# Patient Record
Sex: MALE | Race: WHITE | Employment: OTHER | URBAN - METROPOLITAN AREA
[De-identification: names, ages, dates, MRNs, and addresses within clinical notes are randomized per-mention and may not be internally consistent; named-entity substitution may affect disease eponyms.]

---

## 2017-04-07 ENCOUNTER — HOSPITAL ENCOUNTER (OUTPATIENT)
Age: 77
Setting detail: SPECIMEN
Discharge: HOME | End: 2017-04-07
Payer: MEDICARE

## 2017-04-07 DIAGNOSIS — E78.00 HYPERCHOLESTEREMIA: ICD-10-CM

## 2017-04-07 DIAGNOSIS — I10 ESSENTIAL HYPERTENSION, BENIGN: ICD-10-CM

## 2017-04-07 LAB
ALBUMIN SERPL-MCNC: 4.2 G/DL (ref 3.5–5.2)
ALBUMIN/GLOBULIN RATIO: 1.2 (ref 1–2.5)
ALP BLD-CCNC: 95 U/L (ref 40–129)
ALT SERPL-CCNC: 13 U/L (ref 5–41)
ANION GAP SERPL CALCULATED.3IONS-SCNC: 11 MMOL/L (ref 9–17)
AST SERPL-CCNC: 18 U/L
BILIRUB SERPL-MCNC: 0.4 MG/DL (ref 0.3–1.2)
BUN BLDV-MCNC: 12 MG/DL (ref 8–23)
BUN/CREAT BLD: ABNORMAL (ref 9–20)
CALCIUM SERPL-MCNC: 8.8 MG/DL (ref 8.6–10.4)
CHLORIDE BLD-SCNC: 97 MMOL/L (ref 98–107)
CHOLESTEROL/HDL RATIO: 5.2
CHOLESTEROL: 203 MG/DL
CO2: 26 MMOL/L (ref 20–31)
CREAT SERPL-MCNC: 0.9 MG/DL (ref 0.7–1.2)
GFR AFRICAN AMERICAN: >60 ML/MIN
GFR NON-AFRICAN AMERICAN: >60 ML/MIN
GFR SERPL CREATININE-BSD FRML MDRD: ABNORMAL ML/MIN/{1.73_M2}
GFR SERPL CREATININE-BSD FRML MDRD: ABNORMAL ML/MIN/{1.73_M2}
GLUCOSE BLD-MCNC: 84 MG/DL (ref 70–99)
HCT VFR BLD CALC: 45 % (ref 41–53)
HDLC SERPL-MCNC: 39 MG/DL
HEMOGLOBIN: 14.8 G/DL (ref 13.5–17.5)
LDL CHOLESTEROL: 125 MG/DL (ref 0–130)
MCH RBC QN AUTO: 28.4 PG (ref 26–34)
MCHC RBC AUTO-ENTMCNC: 32.8 G/DL (ref 31–37)
MCV RBC AUTO: 86.7 FL (ref 80–100)
PDW BLD-RTO: 14.4 % (ref 12.5–15.4)
PLATELET # BLD: 346 K/UL (ref 140–450)
PMV BLD AUTO: 8.2 FL (ref 6–12)
POTASSIUM SERPL-SCNC: 4.6 MMOL/L (ref 3.7–5.3)
RBC # BLD: 5.19 M/UL (ref 4.5–5.9)
SODIUM BLD-SCNC: 134 MMOL/L (ref 135–144)
TOTAL PROTEIN: 7.7 G/DL (ref 6.4–8.3)
TRIGL SERPL-MCNC: 193 MG/DL
VLDLC SERPL CALC-MCNC: ABNORMAL MG/DL (ref 1–30)
WBC # BLD: 6.4 K/UL (ref 3.5–11)

## 2018-01-09 PROBLEM — K90.0 CELIAC DISEASE: Status: ACTIVE | Noted: 2018-01-09

## 2018-01-23 ENCOUNTER — HOSPITAL ENCOUNTER (OUTPATIENT)
Age: 78
Setting detail: SPECIMEN
Discharge: HOME | End: 2018-01-23
Payer: MEDICARE

## 2018-01-23 DIAGNOSIS — I10 ESSENTIAL HYPERTENSION, BENIGN: ICD-10-CM

## 2018-01-23 DIAGNOSIS — E78.00 HYPERCHOLESTEREMIA: ICD-10-CM

## 2018-01-23 LAB
ALBUMIN SERPL-MCNC: 4.1 G/DL (ref 3.5–5.2)
ALBUMIN/GLOBULIN RATIO: 1.1 (ref 1–2.5)
ALP BLD-CCNC: 126 U/L (ref 40–129)
ALT SERPL-CCNC: 16 U/L (ref 5–41)
ANION GAP SERPL CALCULATED.3IONS-SCNC: 15 MMOL/L (ref 9–17)
AST SERPL-CCNC: 19 U/L
BILIRUB SERPL-MCNC: 0.51 MG/DL (ref 0.3–1.2)
BUN BLDV-MCNC: 16 MG/DL (ref 8–23)
BUN/CREAT BLD: ABNORMAL (ref 9–20)
CALCIUM SERPL-MCNC: 9.2 MG/DL (ref 8.6–10.4)
CHLORIDE BLD-SCNC: 99 MMOL/L (ref 98–107)
CHOLESTEROL/HDL RATIO: 3.3
CHOLESTEROL: 144 MG/DL
CO2: 24 MMOL/L (ref 20–31)
CREAT SERPL-MCNC: 0.68 MG/DL (ref 0.7–1.2)
GFR AFRICAN AMERICAN: >60 ML/MIN
GFR NON-AFRICAN AMERICAN: >60 ML/MIN
GFR SERPL CREATININE-BSD FRML MDRD: ABNORMAL ML/MIN/{1.73_M2}
GFR SERPL CREATININE-BSD FRML MDRD: ABNORMAL ML/MIN/{1.73_M2}
GLUCOSE FASTING: 89 MG/DL (ref 70–99)
HCT VFR BLD CALC: 44.7 % (ref 40.7–50.3)
HDLC SERPL-MCNC: 44 MG/DL
HEMOGLOBIN: 13.9 G/DL (ref 13–17)
LDL CHOLESTEROL: 76 MG/DL (ref 0–130)
MCH RBC QN AUTO: 27.9 PG (ref 25.2–33.5)
MCHC RBC AUTO-ENTMCNC: 31.1 G/DL (ref 28.4–34.8)
MCV RBC AUTO: 89.6 FL (ref 82.6–102.9)
NRBC AUTOMATED: 0 PER 100 WBC
PDW BLD-RTO: 12.3 % (ref 11.8–14.4)
PLATELET # BLD: 69 K/UL (ref 138–453)
PMV BLD AUTO: 12 FL (ref 8.1–13.5)
POTASSIUM SERPL-SCNC: 4.2 MMOL/L (ref 3.7–5.3)
RBC # BLD: 4.99 M/UL (ref 4.21–5.77)
SODIUM BLD-SCNC: 138 MMOL/L (ref 135–144)
TOTAL PROTEIN: 7.8 G/DL (ref 6.4–8.3)
TRIGL SERPL-MCNC: 121 MG/DL
TSH SERPL DL<=0.05 MIU/L-ACNC: <0.01 MIU/L (ref 0.3–5)
VLDLC SERPL CALC-MCNC: NORMAL MG/DL (ref 1–30)
WBC # BLD: 8.4 K/UL (ref 3.5–11.3)

## 2018-01-26 PROBLEM — E05.90 HYPERTHYROIDISM: Status: ACTIVE | Noted: 2018-01-26

## 2018-06-05 ENCOUNTER — HOSPITAL ENCOUNTER (OUTPATIENT)
Dept: GENERAL RADIOLOGY | Age: 78
Discharge: HOME | End: 2018-06-07
Payer: MEDICARE

## 2018-06-05 ENCOUNTER — OFFICE VISIT (OUTPATIENT)
Dept: ORTHOPEDIC SURGERY | Age: 78
End: 2018-06-05
Payer: MEDICARE

## 2018-06-05 ENCOUNTER — HOSPITAL ENCOUNTER (OUTPATIENT)
Age: 78
Discharge: HOME | End: 2018-06-07
Payer: MEDICARE

## 2018-06-05 VITALS — BODY MASS INDEX: 29.87 KG/M2 | HEIGHT: 68 IN | WEIGHT: 197.09 LBS

## 2018-06-05 DIAGNOSIS — M25.561 CHRONIC PAIN OF BOTH KNEES: ICD-10-CM

## 2018-06-05 DIAGNOSIS — M17.12 PRIMARY OSTEOARTHRITIS OF LEFT KNEE: ICD-10-CM

## 2018-06-05 DIAGNOSIS — M17.11 PRIMARY OSTEOARTHRITIS OF RIGHT KNEE: Primary | ICD-10-CM

## 2018-06-05 DIAGNOSIS — G89.29 CHRONIC PAIN OF BOTH KNEES: ICD-10-CM

## 2018-06-05 DIAGNOSIS — M17.11 PRIMARY OSTEOARTHRITIS OF RIGHT KNEE: ICD-10-CM

## 2018-06-05 DIAGNOSIS — M25.562 CHRONIC PAIN OF BOTH KNEES: ICD-10-CM

## 2018-06-05 PROCEDURE — G8598 ASA/ANTIPLAT THER USED: HCPCS | Performed by: ORTHOPAEDIC SURGERY

## 2018-06-05 PROCEDURE — 4040F PNEUMOC VAC/ADMIN/RCVD: CPT | Performed by: ORTHOPAEDIC SURGERY

## 2018-06-05 PROCEDURE — 73565 X-RAY EXAM OF KNEES: CPT

## 2018-06-05 PROCEDURE — 20610 DRAIN/INJ JOINT/BURSA W/O US: CPT | Performed by: ORTHOPAEDIC SURGERY

## 2018-06-05 PROCEDURE — G8427 DOCREV CUR MEDS BY ELIG CLIN: HCPCS | Performed by: ORTHOPAEDIC SURGERY

## 2018-06-05 PROCEDURE — G8417 CALC BMI ABV UP PARAM F/U: HCPCS | Performed by: ORTHOPAEDIC SURGERY

## 2018-06-05 PROCEDURE — 73560 X-RAY EXAM OF KNEE 1 OR 2: CPT

## 2018-06-05 PROCEDURE — 1123F ACP DISCUSS/DSCN MKR DOCD: CPT | Performed by: ORTHOPAEDIC SURGERY

## 2018-06-05 PROCEDURE — 1036F TOBACCO NON-USER: CPT | Performed by: ORTHOPAEDIC SURGERY

## 2018-06-05 RX ORDER — AMIODARONE HYDROCHLORIDE 200 MG/1
200 TABLET ORAL DAILY
COMMUNITY
Start: 2018-03-19 | End: 2020-08-24 | Stop reason: ALTCHOICE

## 2018-06-05 RX ORDER — METHYLPREDNISOLONE ACETATE 40 MG/ML
40 INJECTION, SUSPENSION INTRA-ARTICULAR; INTRALESIONAL; INTRAMUSCULAR; SOFT TISSUE ONCE
Status: COMPLETED | OUTPATIENT
Start: 2018-06-05 | End: 2018-06-05

## 2018-06-05 RX ADMIN — METHYLPREDNISOLONE ACETATE 40 MG: 40 INJECTION, SUSPENSION INTRA-ARTICULAR; INTRALESIONAL; INTRAMUSCULAR; SOFT TISSUE at 12:07

## 2018-06-05 RX ADMIN — METHYLPREDNISOLONE ACETATE 40 MG: 40 INJECTION, SUSPENSION INTRA-ARTICULAR; INTRALESIONAL; INTRAMUSCULAR; SOFT TISSUE at 12:06

## 2018-10-22 ENCOUNTER — HOSPITAL ENCOUNTER (OUTPATIENT)
Age: 78
Setting detail: SPECIMEN
Discharge: HOME | End: 2018-10-22
Payer: MEDICARE

## 2018-10-22 DIAGNOSIS — E55.9 VITAMIN D INSUFFICIENCY: ICD-10-CM

## 2018-10-22 DIAGNOSIS — F32.A DEPRESSION, UNSPECIFIED DEPRESSION TYPE: ICD-10-CM

## 2018-10-22 DIAGNOSIS — R79.89 LOW TESTOSTERONE IN MALE: ICD-10-CM

## 2018-10-22 DIAGNOSIS — R53.83 FATIGUE, UNSPECIFIED TYPE: ICD-10-CM

## 2018-10-22 LAB
ALBUMIN SERPL-MCNC: 3.9 G/DL (ref 3.5–5.2)
ALBUMIN/GLOBULIN RATIO: 1 (ref 1–2.5)
ALP BLD-CCNC: 103 U/L (ref 40–129)
ALT SERPL-CCNC: 16 U/L (ref 5–41)
ANION GAP SERPL CALCULATED.3IONS-SCNC: 14 MMOL/L (ref 9–17)
AST SERPL-CCNC: 18 U/L
BILIRUB SERPL-MCNC: 0.35 MG/DL (ref 0.3–1.2)
BUN BLDV-MCNC: 23 MG/DL (ref 8–23)
BUN/CREAT BLD: NORMAL (ref 9–20)
CALCIUM SERPL-MCNC: 9.4 MG/DL (ref 8.6–10.4)
CHLORIDE BLD-SCNC: 102 MMOL/L (ref 98–107)
CO2: 27 MMOL/L (ref 20–31)
CREAT SERPL-MCNC: 1.14 MG/DL (ref 0.7–1.2)
GFR AFRICAN AMERICAN: >60 ML/MIN
GFR NON-AFRICAN AMERICAN: >60 ML/MIN
GFR SERPL CREATININE-BSD FRML MDRD: NORMAL ML/MIN/{1.73_M2}
GFR SERPL CREATININE-BSD FRML MDRD: NORMAL ML/MIN/{1.73_M2}
GLUCOSE BLD-MCNC: 80 MG/DL (ref 70–99)
HCT VFR BLD CALC: 43 % (ref 40.7–50.3)
HEMOGLOBIN: 13.2 G/DL (ref 13–17)
MCH RBC QN AUTO: 28.7 PG (ref 25.2–33.5)
MCHC RBC AUTO-ENTMCNC: 30.7 G/DL (ref 28.4–34.8)
MCV RBC AUTO: 93.5 FL (ref 82.6–102.9)
NRBC AUTOMATED: 0 PER 100 WBC
PDW BLD-RTO: 12.9 % (ref 11.8–14.4)
PLATELET # BLD: 389 K/UL (ref 138–453)
PMV BLD AUTO: 10.3 FL (ref 8.1–13.5)
POTASSIUM SERPL-SCNC: 4.5 MMOL/L (ref 3.7–5.3)
RBC # BLD: 4.6 M/UL (ref 4.21–5.77)
SODIUM BLD-SCNC: 143 MMOL/L (ref 135–144)
TOTAL PROTEIN: 7.7 G/DL (ref 6.4–8.3)
TSH SERPL DL<=0.05 MIU/L-ACNC: 2.78 MIU/L (ref 0.3–5)
VITAMIN B-12: 214 PG/ML (ref 232–1245)
WBC # BLD: 8.5 K/UL (ref 3.5–11.3)

## 2018-10-23 LAB
SEX HORMONE BINDING GLOBULIN: 73 NMOL/L (ref 11–80)
TESTOSTERONE FREE-NONMALE: 39.7 PG/ML (ref 47–244)
TESTOSTERONE TOTAL: 348 NG/DL (ref 220–1000)
VITAMIN D 25-HYDROXY: 23.3 NG/ML (ref 30–100)

## 2018-10-29 PROBLEM — E53.8 B12 DEFICIENCY: Status: ACTIVE | Noted: 2018-10-29

## 2019-01-10 ENCOUNTER — HOSPITAL ENCOUNTER (OUTPATIENT)
Age: 79
Setting detail: SPECIMEN
Discharge: HOME | End: 2019-01-10
Payer: MEDICARE

## 2019-01-11 LAB — TISSUE TRANSGLUTAMINASE IGA: 4.1 U/ML

## 2019-02-19 ENCOUNTER — OFFICE VISIT (OUTPATIENT)
Dept: ORTHOPEDIC SURGERY | Age: 79
End: 2019-02-19
Payer: MEDICARE

## 2019-02-19 VITALS — BODY MASS INDEX: 29.55 KG/M2 | WEIGHT: 195 LBS | HEIGHT: 68 IN

## 2019-02-19 DIAGNOSIS — M17.12 PATELLOFEMORAL ARTHRITIS OF LEFT KNEE: Primary | ICD-10-CM

## 2019-02-19 DIAGNOSIS — M17.11 PATELLOFEMORAL ARTHRITIS OF RIGHT KNEE: ICD-10-CM

## 2019-02-19 PROCEDURE — G8417 CALC BMI ABV UP PARAM F/U: HCPCS | Performed by: ORTHOPAEDIC SURGERY

## 2019-02-19 PROCEDURE — 20610 DRAIN/INJ JOINT/BURSA W/O US: CPT | Performed by: ORTHOPAEDIC SURGERY

## 2019-02-19 PROCEDURE — G8482 FLU IMMUNIZE ORDER/ADMIN: HCPCS | Performed by: ORTHOPAEDIC SURGERY

## 2019-02-19 PROCEDURE — 1036F TOBACCO NON-USER: CPT | Performed by: ORTHOPAEDIC SURGERY

## 2019-02-19 PROCEDURE — 1123F ACP DISCUSS/DSCN MKR DOCD: CPT | Performed by: ORTHOPAEDIC SURGERY

## 2019-02-19 PROCEDURE — 1101F PT FALLS ASSESS-DOCD LE1/YR: CPT | Performed by: ORTHOPAEDIC SURGERY

## 2019-02-19 PROCEDURE — G8427 DOCREV CUR MEDS BY ELIG CLIN: HCPCS | Performed by: ORTHOPAEDIC SURGERY

## 2019-02-19 PROCEDURE — G8598 ASA/ANTIPLAT THER USED: HCPCS | Performed by: ORTHOPAEDIC SURGERY

## 2019-02-19 PROCEDURE — 99213 OFFICE O/P EST LOW 20 MIN: CPT | Performed by: ORTHOPAEDIC SURGERY

## 2019-02-19 PROCEDURE — 4040F PNEUMOC VAC/ADMIN/RCVD: CPT | Performed by: ORTHOPAEDIC SURGERY

## 2019-02-19 RX ORDER — METHYLPREDNISOLONE ACETATE 40 MG/ML
40 INJECTION, SUSPENSION INTRA-ARTICULAR; INTRALESIONAL; INTRAMUSCULAR; SOFT TISSUE ONCE
Status: COMPLETED | OUTPATIENT
Start: 2019-02-19 | End: 2019-02-19

## 2019-02-19 RX ADMIN — METHYLPREDNISOLONE ACETATE 40 MG: 40 INJECTION, SUSPENSION INTRA-ARTICULAR; INTRALESIONAL; INTRAMUSCULAR; SOFT TISSUE at 08:31

## 2019-02-19 RX ADMIN — METHYLPREDNISOLONE ACETATE 40 MG: 40 INJECTION, SUSPENSION INTRA-ARTICULAR; INTRALESIONAL; INTRAMUSCULAR; SOFT TISSUE at 08:30

## 2019-03-29 ENCOUNTER — HOSPITAL ENCOUNTER (OUTPATIENT)
Age: 79
Setting detail: SPECIMEN
Discharge: HOME | End: 2019-03-29
Payer: MEDICARE

## 2019-03-29 DIAGNOSIS — E53.8 B12 DEFICIENCY: ICD-10-CM

## 2019-03-30 LAB — VITAMIN B-12: 342 PG/ML (ref 232–1245)

## 2019-06-27 ENCOUNTER — OFFICE VISIT (OUTPATIENT)
Dept: ORTHOPEDIC SURGERY | Age: 79
End: 2019-06-27
Payer: MEDICARE

## 2019-06-27 VITALS — BODY MASS INDEX: 30.41 KG/M2 | HEIGHT: 68 IN | WEIGHT: 200.62 LBS

## 2019-06-27 DIAGNOSIS — M17.12 PATELLOFEMORAL ARTHRITIS OF LEFT KNEE: Primary | ICD-10-CM

## 2019-06-27 PROCEDURE — 4040F PNEUMOC VAC/ADMIN/RCVD: CPT | Performed by: ORTHOPAEDIC SURGERY

## 2019-06-27 PROCEDURE — G8598 ASA/ANTIPLAT THER USED: HCPCS | Performed by: ORTHOPAEDIC SURGERY

## 2019-06-27 PROCEDURE — 99212 OFFICE O/P EST SF 10 MIN: CPT | Performed by: ORTHOPAEDIC SURGERY

## 2019-06-27 PROCEDURE — G8417 CALC BMI ABV UP PARAM F/U: HCPCS | Performed by: ORTHOPAEDIC SURGERY

## 2019-06-27 PROCEDURE — 20610 DRAIN/INJ JOINT/BURSA W/O US: CPT | Performed by: ORTHOPAEDIC SURGERY

## 2019-06-27 PROCEDURE — 1036F TOBACCO NON-USER: CPT | Performed by: ORTHOPAEDIC SURGERY

## 2019-06-27 PROCEDURE — G8427 DOCREV CUR MEDS BY ELIG CLIN: HCPCS | Performed by: ORTHOPAEDIC SURGERY

## 2019-06-27 PROCEDURE — 1123F ACP DISCUSS/DSCN MKR DOCD: CPT | Performed by: ORTHOPAEDIC SURGERY

## 2019-06-27 RX ORDER — METHYLPREDNISOLONE ACETATE 40 MG/ML
40 INJECTION, SUSPENSION INTRA-ARTICULAR; INTRALESIONAL; INTRAMUSCULAR; SOFT TISSUE ONCE
Status: COMPLETED | OUTPATIENT
Start: 2019-06-27 | End: 2019-06-27

## 2019-06-27 RX ADMIN — METHYLPREDNISOLONE ACETATE 40 MG: 40 INJECTION, SUSPENSION INTRA-ARTICULAR; INTRALESIONAL; INTRAMUSCULAR; SOFT TISSUE at 14:58

## 2019-06-27 NOTE — PROGRESS NOTES
Patient with known bilateral primary patellofemoral arthritis is is returning because of pain in the left knee. He says when he gets onto the his knee great difficulty standing up. He also has difficulty with stairs. No history of instability so far. Diagnosis: His alignment shows slight varus deformity. He has excellent motion but there is patellofemoral grating without any instability. Diagnosis: Symptomatic left patellofemoral arthritis. Treatment: I injected 40 mg of Depo-Medrol and 5 cc of 1% plain lidocaine through anterolateral portal under sterile condition and without any complications. I will see him again as necessary.

## 2019-09-24 PROBLEM — I48.19 PERSISTENT ATRIAL FIBRILLATION (HCC): Status: ACTIVE | Noted: 2019-09-24

## 2020-03-12 ENCOUNTER — HOSPITAL ENCOUNTER (OUTPATIENT)
Age: 80
Setting detail: SPECIMEN
Discharge: HOME | End: 2020-03-12
Payer: MEDICARE

## 2020-03-12 LAB
ALBUMIN SERPL-MCNC: 3.8 G/DL (ref 3.5–5.2)
ALBUMIN/GLOBULIN RATIO: 1 (ref 1–2.5)
ALP BLD-CCNC: 120 U/L (ref 40–129)
ALT SERPL-CCNC: 16 U/L (ref 5–41)
ANION GAP SERPL CALCULATED.3IONS-SCNC: 14 MMOL/L (ref 9–17)
AST SERPL-CCNC: 19 U/L
BILIRUB SERPL-MCNC: 0.35 MG/DL (ref 0.3–1.2)
BUN BLDV-MCNC: 24 MG/DL (ref 8–23)
BUN/CREAT BLD: ABNORMAL (ref 9–20)
CALCIUM SERPL-MCNC: 9 MG/DL (ref 8.6–10.4)
CHLORIDE BLD-SCNC: 107 MMOL/L (ref 98–107)
CO2: 20 MMOL/L (ref 20–31)
CREAT SERPL-MCNC: 1.34 MG/DL (ref 0.7–1.2)
GFR AFRICAN AMERICAN: >60 ML/MIN
GFR NON-AFRICAN AMERICAN: 51 ML/MIN
GFR SERPL CREATININE-BSD FRML MDRD: ABNORMAL ML/MIN/{1.73_M2}
GFR SERPL CREATININE-BSD FRML MDRD: ABNORMAL ML/MIN/{1.73_M2}
GLUCOSE FASTING: 88 MG/DL (ref 70–99)
HCT VFR BLD CALC: 41.9 % (ref 40.7–50.3)
HEMOGLOBIN: 12.4 G/DL (ref 13–17)
MCH RBC QN AUTO: 24.7 PG (ref 25.2–33.5)
MCHC RBC AUTO-ENTMCNC: 29.6 G/DL (ref 28.4–34.8)
MCV RBC AUTO: 83.5 FL (ref 82.6–102.9)
NRBC AUTOMATED: 0 PER 100 WBC
PDW BLD-RTO: 14.2 % (ref 11.8–14.4)
PLATELET # BLD: 374 K/UL (ref 138–453)
PMV BLD AUTO: 10.2 FL (ref 8.1–13.5)
POTASSIUM SERPL-SCNC: 4.1 MMOL/L (ref 3.7–5.3)
PROSTATE SPECIFIC ANTIGEN: 1.83 UG/L
RBC # BLD: 5.02 M/UL (ref 4.21–5.77)
SODIUM BLD-SCNC: 141 MMOL/L (ref 135–144)
TOTAL PROTEIN: 7.6 G/DL (ref 6.4–8.3)
TSH SERPL DL<=0.05 MIU/L-ACNC: 0.66 MIU/L (ref 0.3–5)
WBC # BLD: 6.3 K/UL (ref 3.5–11.3)

## 2020-06-09 ENCOUNTER — OFFICE VISIT (OUTPATIENT)
Dept: ORTHOPEDIC SURGERY | Age: 80
End: 2020-06-09
Payer: MEDICARE

## 2020-06-09 VITALS — BODY MASS INDEX: 27.44 KG/M2 | HEIGHT: 67 IN | WEIGHT: 174.8 LBS

## 2020-06-09 PROCEDURE — 99213 OFFICE O/P EST LOW 20 MIN: CPT | Performed by: ORTHOPAEDIC SURGERY

## 2020-06-09 PROCEDURE — 1036F TOBACCO NON-USER: CPT | Performed by: ORTHOPAEDIC SURGERY

## 2020-06-09 PROCEDURE — G8417 CALC BMI ABV UP PARAM F/U: HCPCS | Performed by: ORTHOPAEDIC SURGERY

## 2020-06-09 PROCEDURE — G8427 DOCREV CUR MEDS BY ELIG CLIN: HCPCS | Performed by: ORTHOPAEDIC SURGERY

## 2020-06-09 PROCEDURE — 20610 DRAIN/INJ JOINT/BURSA W/O US: CPT | Performed by: ORTHOPAEDIC SURGERY

## 2020-06-09 PROCEDURE — 4040F PNEUMOC VAC/ADMIN/RCVD: CPT | Performed by: ORTHOPAEDIC SURGERY

## 2020-06-09 PROCEDURE — 1123F ACP DISCUSS/DSCN MKR DOCD: CPT | Performed by: ORTHOPAEDIC SURGERY

## 2020-06-09 RX ORDER — LIDOCAINE HYDROCHLORIDE 10 MG/ML
5 INJECTION, SOLUTION INFILTRATION; PERINEURAL ONCE
Status: COMPLETED | OUTPATIENT
Start: 2020-06-09 | End: 2020-06-10

## 2020-06-09 RX ORDER — METHYLPREDNISOLONE ACETATE 40 MG/ML
40 INJECTION, SUSPENSION INTRA-ARTICULAR; INTRALESIONAL; INTRAMUSCULAR; SOFT TISSUE ONCE
Status: COMPLETED | OUTPATIENT
Start: 2020-06-09 | End: 2020-06-10

## 2020-06-09 NOTE — PROGRESS NOTES
Chief Complaint   Patient presents with    Knee Pain     bilateral  L > R    Hip Pain     right      This patient was previously been seen for bilateral primary patellofemoral arthritis is returning here because of recurrence of pain in both knees left is worse than the right and that both of them swell up. On the left side he feels the pain in the peripatellar region and retropatellar area. On the right side the pain is felt in the peripatellar region and also across the knee joint anteriorly. There is no pain described posteriorly. Other complaint the patient has just had pain over the left SI joint area. He thinks that this is the pain from the hip area. Is not aggravated by coughing or sneezing. There is no numbness or tingling and no radiating pain. There is no change in bowel habits or bladder habits. Pain is felt also when he sleeping. Examination: Examination of the lumbar spine shows normal alignment both in the AP and the lateral view though in the lateral view there may be slight loss of lordosis. There is significant degenerative changes at T12-L1 with anterior osteophyte formation as well space narrowing. There are also degenerative changes at L4-5 with mild listhesis. There is marked loss of disc space at L4-5 and L5 1 with osteophyte formation and neural narrowing of the foramina. I also reviewed his previous x-rays of the knees which show that the tibiofemoral joint spaces well-maintained but he has got marked degenerative changes in the patellofemoral compartment joint worse on the left side with tilting of the patella. Diagnosis: #1 bilateral patellofemoral arthritis left worse than the right. #2 lumbar spondylosis with minor subluxation at L4-5 level. Treatment: Patient states that the pain in the back is tolerable have advised him to take some mild over-the-counter anti-inflammatory medication when there is a flareup.     As for the knees under sterile condition I injected both knees with 40 mg Depo-Medrol and 5 cc of 1% plain lidocaine through anterolateral portal with immediate excellent response to this.     I will see him again as necessary

## 2020-06-10 RX ADMIN — LIDOCAINE HYDROCHLORIDE 5 ML: 10 INJECTION, SOLUTION INFILTRATION; PERINEURAL at 08:21

## 2020-06-10 RX ADMIN — METHYLPREDNISOLONE ACETATE 40 MG: 40 INJECTION, SUSPENSION INTRA-ARTICULAR; INTRALESIONAL; INTRAMUSCULAR; SOFT TISSUE at 08:21

## 2020-08-24 PROBLEM — I50.9 CONGESTIVE HEART FAILURE (HCC): Status: ACTIVE | Noted: 2020-08-24

## 2021-05-20 ENCOUNTER — HOSPITAL ENCOUNTER (EMERGENCY)
Facility: CLINIC | Age: 81
Discharge: HOME | End: 2021-05-20
Attending: EMERGENCY MEDICINE
Payer: MEDICARE

## 2021-05-20 ENCOUNTER — APPOINTMENT (OUTPATIENT)
Dept: GENERAL RADIOLOGY | Facility: CLINIC | Age: 81
End: 2021-05-20
Payer: MEDICARE

## 2021-05-20 VITALS
DIASTOLIC BLOOD PRESSURE: 94 MMHG | WEIGHT: 163 LBS | OXYGEN SATURATION: 96 % | BODY MASS INDEX: 25.53 KG/M2 | TEMPERATURE: 97.7 F | SYSTOLIC BLOOD PRESSURE: 145 MMHG | RESPIRATION RATE: 16 BRPM | HEART RATE: 57 BPM

## 2021-05-20 DIAGNOSIS — S93.602A SPRAIN OF LEFT FOOT, INITIAL ENCOUNTER: ICD-10-CM

## 2021-05-20 DIAGNOSIS — S93.402A SPRAIN OF LEFT ANKLE, UNSPECIFIED LIGAMENT, INITIAL ENCOUNTER: Primary | ICD-10-CM

## 2021-05-20 PROCEDURE — 99282 EMERGENCY DEPT VISIT SF MDM: CPT

## 2021-05-20 PROCEDURE — 73630 X-RAY EXAM OF FOOT: CPT

## 2021-05-20 PROCEDURE — 73610 X-RAY EXAM OF ANKLE: CPT

## 2021-05-20 ASSESSMENT — PAIN DESCRIPTION - PAIN TYPE: TYPE: ACUTE PAIN

## 2021-05-20 ASSESSMENT — PAIN SCALES - GENERAL: PAINLEVEL_OUTOF10: 5

## 2021-05-20 ASSESSMENT — PAIN DESCRIPTION - DESCRIPTORS: DESCRIPTORS: ACHING

## 2021-05-20 NOTE — ED PROVIDER NOTES
Take 1 tablet by mouth 2 times daily    SERTRALINE (ZOLOFT) 25 MG TABLET    Take 1 tablet by mouth daily    TAMSULOSIN (FLOMAX) 0.4 MG CAPSULE    Take 0.4 mg by mouth daily. TESTOSTERONE UNDECANOATE 750 MG/3ML SOLN    Inject into the muscle Every 10 wks . ALLERGIES     has No Known Allergies. FAMILY HISTORY     He indicated that his mother is . He indicated that his father is . family history is not on file. SOCIAL HISTORY      reports that he quit smoking about 47 years ago. His smoking use included cigarettes. He has a 45.00 pack-year smoking history. He has never used smokeless tobacco. He reports current alcohol use. PHYSICAL EXAM       ED Triage Vitals [21 1253]   BP Temp Temp Source Pulse Resp SpO2 Height Weight   (!) 145/94 97.7 °F (36.5 °C) Oral 57 16 96 % -- 163 lb (73.9 kg)       Constitutional: Alert, oriented x3, nontoxic, answering questions appropriately, acting properly for age, in no acute distress   HEENT: Extraocular muscles intact,   Neck: Trachea midline,   Musculoskeletal: Left lower extremity no pain at the hip or knee no fibular head tenderness. There is swelling to lateral malleolus without bony point tenderness drawer negative. Pedal pulse intact and capillary refill less than 2 seconds. Swelling to the dorsum of the foot with tenderness over the fifth metatarsal.  No deformity. Neurologic: Left lower extremity motor and sensory intact. Skin: Warm and dry     DIFFERENTIAL DIAGNOSIS/ MDM:     X-ray left foot and ankle. DIAGNOSTIC RESULTS     EKG: All EKG's are interpreted by the Emergency Department Physician who either signs or Co-signs this chart in the absence of a cardiologist.        Not indicated unless otherwise documented above    LABS:  No results found for this visit on 21.     Not indicated unless otherwise documented above    RADIOLOGY:   I reviewed the radiologist interpretations:    XR FOOT LEFT (MIN 3 VIEWS)   Final [facility]    Schedule an appointment as soon as possible for a visit in 1 week        DISCHARGE MEDICATIONS:  New Prescriptions    No medications on file       (Please note that portions of thisnote were completed with a voice recognition program.  Efforts were made to edit the dictations but occasionally words are mis-transcribed.)    Shahla Wilkins DO DO  Attending Emergency Physician      Shahla Wilkins DO  05/20/21 8174

## 2021-06-25 DIAGNOSIS — M17.0 BILATERAL PRIMARY OSTEOARTHRITIS OF KNEE: Primary | ICD-10-CM

## 2021-07-13 ENCOUNTER — OFFICE VISIT (OUTPATIENT)
Dept: ORTHOPEDIC SURGERY | Age: 81
End: 2021-07-13
Payer: MEDICARE

## 2021-07-13 VITALS — BODY MASS INDEX: 25.46 KG/M2 | HEIGHT: 68 IN | WEIGHT: 168 LBS

## 2021-07-13 DIAGNOSIS — M17.0 BILATERAL PRIMARY OSTEOARTHRITIS OF KNEE: ICD-10-CM

## 2021-07-13 DIAGNOSIS — S93.492A SPRAIN OF ANTERIOR TALOFIBULAR LIGAMENT OF LEFT ANKLE, INITIAL ENCOUNTER: Primary | ICD-10-CM

## 2021-07-13 PROCEDURE — 99213 OFFICE O/P EST LOW 20 MIN: CPT | Performed by: ORTHOPAEDIC SURGERY

## 2021-07-13 PROCEDURE — 20610 DRAIN/INJ JOINT/BURSA W/O US: CPT | Performed by: ORTHOPAEDIC SURGERY

## 2021-07-13 PROCEDURE — 1036F TOBACCO NON-USER: CPT | Performed by: ORTHOPAEDIC SURGERY

## 2021-07-13 PROCEDURE — 4040F PNEUMOC VAC/ADMIN/RCVD: CPT | Performed by: ORTHOPAEDIC SURGERY

## 2021-07-13 PROCEDURE — G8417 CALC BMI ABV UP PARAM F/U: HCPCS | Performed by: ORTHOPAEDIC SURGERY

## 2021-07-13 PROCEDURE — G8427 DOCREV CUR MEDS BY ELIG CLIN: HCPCS | Performed by: ORTHOPAEDIC SURGERY

## 2021-07-13 PROCEDURE — 1123F ACP DISCUSS/DSCN MKR DOCD: CPT | Performed by: ORTHOPAEDIC SURGERY

## 2021-07-13 RX ORDER — METHYLPREDNISOLONE ACETATE 40 MG/ML
40 INJECTION, SUSPENSION INTRA-ARTICULAR; INTRALESIONAL; INTRAMUSCULAR; SOFT TISSUE ONCE
Status: COMPLETED | OUTPATIENT
Start: 2021-07-13 | End: 2021-07-13

## 2021-07-13 RX ORDER — VALSARTAN 80 MG/1
80 TABLET ORAL 2 TIMES DAILY
COMMUNITY

## 2021-07-13 RX ORDER — LIDOCAINE HYDROCHLORIDE 10 MG/ML
10 INJECTION, SOLUTION INFILTRATION; PERINEURAL ONCE
Status: COMPLETED | OUTPATIENT
Start: 2021-07-13 | End: 2021-07-13

## 2021-07-13 RX ADMIN — METHYLPREDNISOLONE ACETATE 40 MG: 40 INJECTION, SUSPENSION INTRA-ARTICULAR; INTRALESIONAL; INTRAMUSCULAR; SOFT TISSUE at 15:34

## 2021-07-13 RX ADMIN — LIDOCAINE HYDROCHLORIDE 10 ML: 10 INJECTION, SOLUTION INFILTRATION; PERINEURAL at 15:33

## 2021-07-13 NOTE — PROGRESS NOTES
[facility]  [facility] [address]      7/13/2021    Yani Camargo  1940  Katiana Dixon MD      Chief Complaint   Patient presents with    Follow-up     Bilateral knee pain, left foot pain       Yani Camargo is a Established patient patient to this office. He was referred to our location by Dr. Jesusita Ponce. HPI this patient who is known to bilateral primary osteoarthritis of the knees is seen here for 2 problems. One is recurrence of pain in both the knees right being worse than the left. However in May he had injured his left foot and he is still having residual problems. Somehow he had twisted his foot in the bedroom and had a lot of bruising of the lateral side of the foot and pain. He still continues to have pain especially if he is doing a lot of walking. And sometimes he feels that when he is walking he is slapping his foot on the floor. After reviewing his x-rays I question whether he had a previous injury and a admitted that while he was in the [employer] he was playing basketball with his boots and had injured the left ankle and had immediately swollen up so badly they had to cut his boot. The pain he describes is just below the ankle level. It has not given out on him but sometimes he feels unsteady. Examination of the knees show he has no effusion in either side. He has mild varus deformity. His he really has excellent range of motion without any flexion deformity. Both the knees are stable to medial lateral stresses. Examination of the ankle shows he has tenderness over the anterior talofibular ligament. Inversion was also painful. There is no ecchymosis now. He has excellent full motion in the ankle on 5/20/2021. The radiologist report no abnormality but does do see a little avulsion fracture of the tip of the medial malleolus. This corresponds to the his injury in the [employer].     I did a stress x-ray of his ankle stressing in inversion and plantarflexion and it shows no instability of the ankle joint. I also reviewed his previous x-rays of 5/20/2021. The radiologist reported there was no injury or abnormality but I do see a small irish of bone distal to the medial malleolus coinciding with the injury sustained when he was in the [place]    Diagnoses: #1 primary bilateral osteoarthritis of the knees. #2 strain anterior talofibular ligament. Treatment: Under sterile condition I injected both the knees for the 40 mg of Depo-Medrol and 5 cc of 1% plain lidocaine he had immediate excellent response to this. It should be noted the last time he had corticosteroid injection was over a year ago. As for the ankle I am starting him on physical therapy and I will see him again in 4 weeks time. In the meantime I have told him that he can continue his normal routine exercise walking. Follow Up:   No follow-ups on file.

## 2021-07-16 ENCOUNTER — HOSPITAL ENCOUNTER (OUTPATIENT)
Dept: PHYSICAL THERAPY | Facility: CLINIC | Age: 81
Setting detail: THERAPIES SERIES
Discharge: HOME | End: 2021-07-16
Payer: MEDICARE

## 2021-07-16 NOTE — FLOWSHEET NOTE
[] [address]  [address]    [phone]  F: [phone]   [x] [address]  [facility]   [address]  P: [phone]  F: [phone]  [] [place]  [address]  P: [phone]  F: [phone] [] [address]  P: [phone]  F: [phone]  [] [address]  [address] [facility]   [address]   [place]: [phone]  F: [phone]   [] [address]  [place]: [phone]   F: [phone]     Physical Therapy Cancel/No Show note    Date: 2021  Patient: Juwan Mccabe  : 1940  MRN: [id number]    Cancels/No Shows to date:     For today's appointment patient:    [x]  Cancelled    [] Rescheduled appointment    [] No-show     Reason given by patient:    [x]  Patient ill    []  Conflicting appointment    [] No transportation      [] Conflict with work    [] No reason given    [] Weather related    [] COVID-19    [x] Other:      Comments: cancelled initial evaluation as pt is currently in hospital for A Fib, pt plans to call back to reschedule eval when able      [] Next appointment was confirmed    Electronically signed by:  Song Rios PT

## 2021-07-26 ENCOUNTER — HOSPITAL ENCOUNTER (OUTPATIENT)
Dept: PHYSICAL THERAPY | Facility: CLINIC | Age: 81
Setting detail: THERAPIES SERIES
Discharge: HOME | End: 2021-07-26
Payer: MEDICARE

## 2021-07-26 PROCEDURE — 97110 THERAPEUTIC EXERCISES: CPT

## 2021-07-26 PROCEDURE — 97161 PT EVAL LOW COMPLEX 20 MIN: CPT

## 2021-07-26 NOTE — CONSULTS
[] [facility]) - [facility] &  Therapy  [address]  [phone]  F: [phone] [x] [address]  [place] [address]   [address]  P: [phone]  F: [phone] [] [address]  Therapy  [address]  P: [phone]  F: [phone] [] [address]  P: [phone]  F: [phone] [] [address]  [facility]   [address]   [place]: [phone]  F: [phone]      Physical Therapy Spine Evaluation    Date:  2021  Patient: Rupert Begum  : 1940  MRN: [id number]  Physician: Annabella Ron MD   Insurance: Medicare A and B, [employer] Supp   Medical Diagnosis: G71.191T- Sprain of anterior talofibular ligament of left ankle, initial encounter Rehab Codes: M25.572, M25.37, M62.81, R26.89  Onset Date: 2021    Next 's appt.:     Subjective:   CC: 80 y.o. male presents to physical therapy with complaint of L ankle pain that began in May 2021. Pt notes that he was walking and got his slipped caught in a rug causing him to roll his ankle anteriorly in his bedroom. Pt notes since then pain and instability in L ankle has increased. Pt notes some numbness in L shin to lateral L foot and has difficulty with heel strike while walking. Pt notes pain has improved slightly since initial injury. Pt has not tried any ice, heat, medications for pain management. Pt does not report any swelling in L foot/ankle. Pt does not report any pain or difficulty with ambulating long distances, going up or down stairs, or standing for a long period of time. History of L ankle fracture in  that has since resolved. Patient has fallen 4-5x in the past year.  Pt notes that with his A-fib and medications he feels onset of dizziness with positional changes. *(Patient notes at end of session that he will be seeking treatment for chronic A-fib at [address] [place] [address],[address] next week and asks to hold further scheduling for physical therapy treatment sessions.)*      PMHx:    [x] MI/Heart Problems  [x] Arthritis [x] Other: chronic A-fib              [x] Refer to full medical chart  In EPIC       Comorbidities:   [] Obesity [] Dialysis  [] N/A   [] Asthma/COPD [] Dementia [x] Other: CAD   [] Stroke [] Sleep apnea [] Other:   [] Vascular disease [] Rheumatic disease [] Other:     Tests: [x] X-Ray:   There is no fracture on the lateral side but of    the medial side there is a small old avulsion fracture of the medial    malleolus       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       Degenerative narrowing of all interphalangeal joints. [] MRI:  [] Other:    Medications: [x] Refer to full medical record [] None [] Other:  Allergies:      [] Refer to full medical record [x] None [] Other:    Function:  Hand Dominance  [x] Right  [] Left  Patient lives with: Wife   In what type of home []  One story   [] Two story   [x] Split level   Number of stairs to enter 1   With handrail on the [x]  Right to enter   [] Left to enter   Bathroom has a []  Tub only  [x] Tub/shower combo   [] Walk in shower    []  Grab bars   Washing machine is on [x]  Main level   [] Second level   [] Basement   Employer Self Employed- selling equipment    Job Status [x]  Normal duty   [] Light duty   [] Off due to condition    []  Retired   [] Not employed   [] Disability  [] Other:  []  Return to work:    Work activities/duties Sitting        ADL/IADL Previous level of function Current level of function Who currently assists the patient with task   Bathing  [x] Independent  [] Assist [x] Independent  [] Assist    Dress/grooming [x] Independent  [] Assist [x] Independent  [] Assist    Transfer/mobility [x] Independent  [] Assist [x] Independent  [] Assist    Feeding [x] Independent  [] Assist [x] Independent  [] Assist    Toileting [x] Independent  [] Assist [x] Independent  [] Assist    Driving [x] Independent  [] Assist [x] Independent  [] Assist    Housekeeping [x] Independent  [] Assist [x] Independent  [] Assist    Grocery shop/meal prep [x] Independent  [] Assist [x] Independent  [] Assist      Gait Prior level of function Current level of function    [x] Independent  [] Assist [x] Independent  [] Assist   Device: [x] Independent [x] Independent    [] Straight Cane [] Quad cane [] Straight Cane [] Quad cane    [] Standard walker [] Rolling walker   [] 4 wheeled walker [] Standard walker [] Rolling walker   [] 4 wheeled walker    [] Wheelchair [] Wheelchair       Symptoms:    Pain present? Yes   Location L foot lateral aspect    Pain Rating currently 0/10   Pain at worst 4/10   Pain at best 0/10   Description of pain Intermittent, stabbing    Altered Sensation Numbness and tingling through lateral ankle up to lateral knee   What makes it worse Plantar flexion   What makes it better Activity modification, rest   Symptom progression Improving    Sleep Not disturbed           Objective:    STRENGTH    Left Right   Hip Flex 4+ 4+   Ext 4 4   ABD 3+ 3+   ADD     Knee Flex 4+ 4+   Ext 4+ 4+   Ankle DF 4 4+   PF 4+ 4+   INV 4 4+   EVER 4 4+            ROM  ° A/P    Left Right   Hip Flex     Ext     ER     IR     ABD     ADD     Knee Flex     Ext     Ankle DF 0 2   PF 40 48   INV 40 35   EVER 8 13          TESTS (+/-) Left Right Not Tested   Ant. Drawer   [x]   Post. Drawer   [x]   Lachmans   [x]   Valgus Stress   [x]   Varus Stress   [x]   Lashons   [x]   Apleys Comp.    [x]   Apleys Dist.   [x]   Hip Scouring   [x]   NOVAs   [x]   Piriformis   [x]   Tiffanies   [x]   Talor Tilt -  []   Pat-Fem Grind   [x]       OBSERVATION No Deficit Deficit Not Tested Comments   Posture       Pronation [x] [] []    Supination [x] [] []    Palpation [] [x] [] Discomfort noted through lateral malleolus to lateral aspect of L foot globally, Increased tone and tenderness to proximal 1/3 peroneals and lateral gastroc   Sensation [] [x] [] Numbness to L5-S1 dermatome distribution LLE   Edema [x] [] []    Gait [] [x] [] Analysis: Pt demonstrates slight antalgic gait pattern with reduced heel-toe progression on LLE. Decreased gait speed and bilateral step length. Slight LLE drop foot. FUNCTION Normal Difficult Unable   Sitting [x] [] []   Standing [x] [] []   Ambulation [x] [] []   Groom/Dress [x] [] []   Lift/Carry [x] [] []   Stairs [x] [] []   Bending [x] [] []   Squat [] [x] []   Kneel [] [x] []         BALANCE/PROPRIOCEPTION              [x] Not tested   Single leg stance       R                     L                                PAIN   Eyes open                             Sec. Sec                  . []    Eyes closed                          Sec. Sec                  .[]      NBOS EO- 30 sec. NBOS EC- 20 sec. Increased instability LLE   Tandem stance- RLE 20 sec. LLE 15 sec with LOB, increased instability   No complaint of pain. FUNCTIONAL TESTS PAIN NO PAIN COMMENTS   Step Test 4 [] [x]    6 [] []    8 [] []    Squat [] [x]           Flexibility Normal Left tight Right tight   Hip flexor [] [] []   quad [] [] []   HS [] [x] []   piriformis [] [] []   ITB [] [] []   gastroc [] [x] []   Soleus  [] [x] []    [] [] []    [] [] []        Somatic Dysfunctions Normal Deficit Details   Cervical   [x] []    Thoracic   [x] []    Rib   [x] []    Pelvis   [x] []    Lumbar [x] []    SI   [x] []        Functional Test: FAAM Score: 4.8% functionally impaired       Comments:      Assessment:    80 y.o. male presents to physical therapy with L ankle pain and instability, reduced BLE strength globally, reduced L ankle AROM, gait and balance deficits.  Pt demonstrates gait impairments that include reduced heel strike on LLE, slight drop foot, reduced bilateral step length and gait speed. These signs and symptoms are consistent with medical diagnosis of L ankle ATFL sprain. These impairments are increasing difficulty with daily activities. Patient also presents as a fall risk as he reports at least 4-5 falls within the past 6 months. Patient would benefit from skilled physical therapy services in order to: reduce L ankle pain, restore L ankle range of motion and BLE strength globally, and improve L ankle proprioception/stability to reduce gait and balance deficits and return pt to prior level of funciton. Problems:    [x] ? Pain: 4/10 pain L lateral foot  [x] ? ROM: decreased L ankle AROM in eversion and DF   [x] ? Strength: reduced bilateral LE strength globally   [x] ? Function: reduced function indicated by LEFI score of 4.8% impaired   [x] Other: gait and balance deficits     STG: (to be met in 8 treatments)  1. ? Pain: Decrease L ankle pain levels to 2/10 or less to improve tolerance to therapeutic exercise progressions. 2. ? ROM: IncreaseAROM limitations throughout L ankle to equal bilat to reduce difficulty with ADLs. 3. ? Strength: Increase BLE strength to 5/5 globally to improve safety and tolerance to prolonged ambulation and stair climbing tasks. 4. Pt to be independent and compliant with Home Exercise Programs  5. Demonstrate Knowledge of fall prevention    LTG: (to be met in 12 treatments)  1. Improve score on assessment tool LEFI from 4.8% to 2% or less. 2. Reduce L ankle pain levels to 0-1/10 to improve tolerance to all daily activities. 3. Pt to be able to ambulate at least 150 feet demonstrating safety awareness and improved gait mechanics including improved L heel strike, equal bilateral step length, and improved gait speed. 4. Pt to demonstrate improved L ankle stability with ability to maintain tandem stance with LLE behind for at least 20 seconds without LOB.                      Patient goals: reduce pain    Rehab Potential:  [x] Good  [] Fair [] Poor   Suggested Professional Referral:  [x] No  [] Yes:  Barriers to Goal Achievement[de-identified]  [] No  [x] Yes: comorbidities- pt currently dealing with chronic A-fib and seeking treatment at outside hospital  Domestic Concerns:  [x] No  [] Yes:    Pt. Education:  [x] Plans/Goals, Risks/Benefits discussed  [x] Home exercise program    Method of Education: [x] Verbal  [x] Demo  [x] Written  7/26/21- HEP given of charted exercises, [employer] #[id number], fall risk prevention handout discussed and provided   Comprehension of Education:  [x] Verbalizes understanding. [x] Demonstrates understanding. [x] Needs Review. [] Demonstrates/verbalizes understanding of HEP/Ed previously given. Treatment Plan:  [x] Therapeutic Exercise   20474  [] Iontophoresis: 4 mg/mL Dexamethasone Sodium Phosphate  mAmin  68400   [] Therapeutic Activity  65595 [x] Vasopneumatic cold with compression  55943    [x] Gait Training   25052 [] Ultrasound   80436   [x] Neuromuscular Re-education  39985 [] Electrical Stimulation Unattended  44886   [x] Manual Therapy  18041 [] Electrical Stimulation Attended  64980   [x] Instruction in HEP  [] Lumbar/Cervical Traction  11079   [] Aquatic Therapy   98229 [x] Cold/hotpack    [] Massage   54029      [] Dry Needling, 1 or 2 muscles  37699   [] Biofeedback, first 15 minutes   55817  [] Biofeedback, additional 15 minutes   33307 [] Dry Needling, 3 or more muscles  22494            [x]  Medication allergies reviewed for use of    Dexamethasone Sodium Phosphate 4mg/ml     with iontophoresis treatments. Pt is not allergic.     Frequency:  2 x/week for 12 visits    Todays Treatment:    Exercises:  Exercise     Reps/ Time Weight/ Level Comments         DF+ Inv stretch 3x30\"     Ankle Pumps 2x10     Ankle Alphabet  1x     4 way ankle  10x                                                                 Other:    Specific Instructions for next treatment: L ankle ROM, ankle stability and proprioception, progress hip and ankle strength per pt tolerance     Evaluation Complexity:  History (Personal factors, comorbidities) [] 0 [x] 1-2 [] 3+   Exam (limitations, restrictions) [] 1-2 [] 3 [x] 4+   Clinical presentation (progression) [x] Stable [] Evolving  [] Unstable   Decision Making [x] Low [] Moderate [] High    [x] Low Complexity [] Moderate Complexity [] High Complexity       Treatment Charges: Mins Units   [x] Evaluation       [x]  Low       []  Moderate       []  High 35 1   []  Modalities     [x]  Ther Exercise 10 1   []  Manual Therapy     []  Ther Activities     []  Aquatics     []  Vasocompression     []  Other       TOTAL TREATMENT TIME: 45 minutes   Estimated medicare costs as of 07/27/21: $120.85    Time in: 3:10 pm   Time Out: 3:55 pm    Electronically signed by: Tien Bianchi PT        Physician Signature:________________________________Date:__________________  By signing above or cosigning this note, I have reviewed this plan of care and certify a need for medically necessary rehabilitation services.      *PLEASE SIGN ABOVE AND FAX BACK ALL PAGES*

## 2021-07-27 NOTE — FLOWSHEET NOTE
[facility] Fall Risk Assessment    Patient Name:  Matteo Maravilla  : 1940        Risk Factor Scale  Score   History of Falls [x] Yes  [] No 25  0 25   Secondary Diagnosis [] Yes  [x] No 15  0 0   Ambulatory Aid [] Furniture  [] Crutches/cane/walker  [x] None/bedrest/wheelchair/nurse 30  15  0 0   IV/Heparin Lock [] Yes  [x] No 20  0 0   Gait/Transferring [] Impaired  [] Weak  [x] Normal/bedrest/immobile 20  10  0 0   Mental Status [] Forgets limitations  [x] Oriented to own ability 15  0 0      Total: 25     Based on the Assessment score: check the appropriate box. []  No intervention needed   Low =   Score of 0-24    [x]  Use standard prevention interventions Moderate =  Score of 24-44   [x] Give patient handout and discuss fall prevention strategies   [x] Establish goal of education for patient/family RE: fall prevention strategies    []  Use high risk prevention interventions High = Score of 45 and higher   [] Give patient handout and discuss fall prevention strategies   [] Establish goal of education for patient/family Re: fall prevention strategies   [] Discuss lifeline / other resources    Electronically signed by:    Mariana Guardado, PT  Date: 2021

## 2021-07-27 NOTE — PLAN OF CARE
[] [facility]) - [facility] &  Therapy  [address]  [phone]  F: [phone] [x] [address]  [facility]   [address]  P: [phone]  F: [phone] [] [address] &  Therapy  [address]  P: [phone]  F: [phone] [] [address]  P: [phone]  F: [phone] [] [address]  [facility]   [address]   [place]: [phone]  F: [phone]        Physical Therapy Plan of Care    Date:  2021  Patient: Carolyn Palomares  : 1940  MRN: [id number]  Physician: Peter Morton MD                           Insurance: Medicare A and B, [employer] Medicare Supp   Medical Diagnosis: K45.010C- Sprain of anterior talofibular ligament of left ankle, initial encounter Rehab Codes: M25.572, M25.37, M62.81, R26.89  Onset Date: 2021                                      Next 's appt.:       Subjective:   CC: 80 y.o. male presents to physical therapy with complaint of L ankle pain that began in May 2021. Pt notes that he was walking and got his slipped caught in a rug causing him to roll his ankle anteriorly in his bedroom. Pt notes since then pain and instability in L ankle has increased. Pt notes some numbness in L shin to lateral L foot and has difficulty with heel strike while walking. Pt notes pain has improved slightly since initial injury. Pt has not tried any ice, heat, medications for pain management. Pt does not report any swelling in L foot/ankle. Pt does not report any pain or difficulty with ambulating long distances, going up or down stairs, or standing for a long period of time.      History of L ankle fracture in  that has since resolved. Patient has fallen 4-5x in the past year.  Pt notes that with his A-fib and medications he feels onset of dizziness with positional changes.      *(Patient notes at end of session that he will be seeking treatment for chronic A-fib at [address] [place] [address],[address] next week and asks to hold further scheduling for physical therapy treatment sessions.)*      Assessment:   80 y.o. male presents to physical therapy with L ankle pain and instability, reduced BLE strength globally, reduced L ankle AROM, gait and balance deficits. Pt demonstrates gait impairments that include reduced heel strike on LLE, slight drop foot, reduced bilateral step length and gait speed. These signs and symptoms are consistent with medical diagnosis of L ankle ATFL sprain. These impairments are increasing difficulty with daily activities. Patient also presents as a fall risk as he reports at least 4-5 falls within the past 6 months. Patient would benefit from skilled physical therapy services in order to: reduce L ankle pain, restore L ankle range of motion and BLE strength globally, and improve L ankle proprioception/stability to reduce gait and balance deficits and return pt to prior level of funciton. Problems:    [x]? ? Pain: 4/10 pain L lateral foot  [x]? ? ROM: decreased L ankle AROM in eversion and DF   [x]? ? Strength: reduced bilateral LE strength globally   [x]? ? Function: reduced function indicated by LEFI score of 4.8% impaired   [x]? Other: gait and balance deficits      STG: (to be met in 8 treatments)  1. ? Pain: Decrease L ankle pain levels to 2/10 or less to improve tolerance to therapeutic exercise progressions. 2. ? ROM: IncreaseAROM limitations throughout L ankle to equal bilat to reduce difficulty with ADLs. 3. ? Strength: Increase BLE strength to 5/5 globally to improve safety and tolerance to prolonged ambulation and stair climbing tasks. 4. Pt to be independent and compliant with Home Exercise Programs  5.  Demonstrate Knowledge of fall prevention     LTG: (to be met in 12 treatments)  1. Improve score on assessment tool LEFI from 4.8% to 2% or less. 2. Reduce L ankle pain levels to 0-1/10 to improve tolerance to all daily activities. 3. Pt to be able to ambulate at least 150 feet demonstrating safety awareness and improved gait mechanics including improved L heel strike, equal bilateral step length, and improved gait speed. 4. Pt to demonstrate improved L ankle stability with ability to maintain tandem stance with LLE behind for at least 20 seconds without LOB.                   Patient goals: reduce pain         Treatment Plan:  [x] Therapeutic Exercise   45788  [] Iontophoresis: 4 mg/mL Dexamethasone Sodium Phosphate  mAmin  57952   [] Therapeutic Activity  24061 [x] Vasopneumatic cold with compression  93842    [x] Gait Training   40542 [] Ultrasound   55284   [x] Neuromuscular Re-education  09357 [] Electrical Stimulation Unattended  64786   [x] Manual Therapy  03286 [] Electrical Stimulation Attended  89640   [x] Instruction in HEP  [] Lumbar/Cervical Traction  97892   [] Aquatic Therapy   93746 [x] Cold/hotpack    [] Massage   82982      [] Dry Needling, 1 or 2 muscles  08784   [] Biofeedback, first 15 minutes   32555  [] Biofeedback, additional 15 minutes   84013 [] Dry Needling, 3 or more muscles  75922     [x]  Medication allergies reviewed for use of    Dexamethasone Sodium Phosphate 4mg/ml     with iontophoresis treatments. Pt is not allergic. Frequency:  2 x/week for 12 visits    Electronically signed by: Jaci Mcelroy PT        Physician Signature:________________________________Date:__________________  By signing above or cosigning this note, I have reviewed this plan of care and certify a need for medically necessary rehabilitation services.      *PLEASE SIGN ABOVE AND FAX BACK ALL PAGES*

## 2021-08-03 ENCOUNTER — OFFICE VISIT (OUTPATIENT)
Dept: ORTHOPEDIC SURGERY | Age: 81
End: 2021-08-03
Payer: MEDICARE

## 2021-08-03 ENCOUNTER — HOSPITAL ENCOUNTER (OUTPATIENT)
Dept: PHYSICAL THERAPY | Facility: CLINIC | Age: 81
Setting detail: THERAPIES SERIES
Discharge: HOME | End: 2021-08-03
Payer: MEDICARE

## 2021-08-03 VITALS — HEIGHT: 68 IN | WEIGHT: 168 LBS | BODY MASS INDEX: 25.46 KG/M2

## 2021-08-03 DIAGNOSIS — M17.10 ARTHRITIS OF KNEE: Primary | ICD-10-CM

## 2021-08-03 PROCEDURE — G8417 CALC BMI ABV UP PARAM F/U: HCPCS | Performed by: ORTHOPAEDIC SURGERY

## 2021-08-03 PROCEDURE — 97110 THERAPEUTIC EXERCISES: CPT

## 2021-08-03 PROCEDURE — G8427 DOCREV CUR MEDS BY ELIG CLIN: HCPCS | Performed by: ORTHOPAEDIC SURGERY

## 2021-08-03 PROCEDURE — 99211 OFF/OP EST MAY X REQ PHY/QHP: CPT | Performed by: ORTHOPAEDIC SURGERY

## 2021-08-03 NOTE — PROGRESS NOTES
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 The patient has been going to physical therapy and has attended about 3 sessions. He says the left is doing fine he has no problem with the right knee gets worse after the therapy. However the knee symptoms have definitely improved over the time. The left knee being completely asymptomatic now. Examination: He does mild flexion contracture both knees. There is crepitation in all the compartments particularly in the patellofemoral compartments. Diagnosis: Bilateral primary osteoarthritis of the knees. Treatment: I asked him to call the therapist and tell him that he does not need to continue with the therapy. He can continue doing this at home. Take over-the-counter pain medication. Should his symptoms get worse then I will be happy to see him again.

## 2021-08-03 NOTE — FLOWSHEET NOTE
[] [facility]) - [facility] &  Therapy  [address]  [phone]  F: [phone] [x] [address]  [facility]   [address]  P: [phone]  F: [phone] [] [address]  [address]  P: [phone]  F: [phone] [] [address]  P: [phone]  F: [phone] [] [address]  [facility]   [address]   [place]: [phone]  F: [phone]      Physical Therapy Daily Treatment Note    Date:  8/3/2021  Patient Name:  Hamzah Castro    :  1940  MRN: [id number]  Physician: Rony Rios MD                           Insurance: Medicare A and B, [place] Medicare Supp   Medical Diagnosis: Y55.837L- Sprain of anterior talofibular ligament of left ankle, initial encounter Rehab Codes: M25.572, M25.37, M62.81, R26.89  Onset Date: 2021                                      Next 's appt. :   Visit# / total visits: ; Progress note for Medicare patient due at visit 10     Cancels/No Shows: 0/0    Subjective:    Pain:  [] Yes  [x] No Location: L ankle Pain Rating: (0-10 scale) 0/10  Pain altered Tx:  [x] No  [] Yes  Action:  Comments: Pt arrives 17 minutes late to appointment coming from follow up appointment with dr. Daniele Vegas. Pt reports that his ankle feels good and exercises at home are going okay.       Objective:  Modalities:   Precautions: chronic A-Fib, dizziness   Exercises:  Exercises:  Exercise       Reps/ Time Weight/ Level Comments             DF+ Inv stretch 3x30\"       Ankle Pumps 2x10       Ankle Alphabet  1x       4 way ankle  10x  Lime     Bridges 10x A     Hooklying Clamshells 15x Lime                BAPS  15xea  L1  seated- A/P, M/L, clockwise/counter   Heel Raises  20x  1#  seated   Toe Raises   20x    seated, cues for eccentric lowering              SB Stretch 3x30\"        Mini Squats 10x        Heel Raises  2x10        NBOS EC  2x30\"        NBOS EO  2x30\"    foam   Tandem Stance 2x30\"     Other:      Specific Instructions for next treatment: L ankle ROM, ankle stability and proprioception, progress hip and ankle strength per pt tolerance       Treatment Charges: Mins Units   []  Modalities     [x]  Ther Exercise 43 3   []  Manual Therapy     []  Ther Activities     []  Aquatics     []  Vasocompression     []  Other     Total Treatment time 43 3      Estimated medicare costs as of 07/27/21: $195.74    Assessment: [x] Progressing toward goals. Reviewed HEP at beginning of session. Pt demonstrates good compliance to HEP with ability to perform all exercises with minimal need for cueing for appropriate technique. Continued to progress ankle stability and range of motion as well as introduction of hip strength. Pt required frequent cueing to perform exercises in a slow and controlled manner with fair carryover. Pt demonstrates difficulty with toe raises and dorsiflexion range of motion which is impairing his gait. Pt attempts toe raises standing but is unable to perform due to limited strength and AROM in standing position but demonstrated improved ability to complete in sitting. Good tolerance overall to all exercises and progressions this date. [] No change. [] Other:  [x] Patient would continue to benefit from skilled physical therapy services in order to: reduce L ankle pain, restore L ankle range of motion and BLE strength globally, and improve L ankle proprioception/stability to reduce gait and balance deficits and return pt to prior level of funciton. STG/LTG   STG: (to be met in 8 treatments)  1. ? Pain: Decrease L ankle pain levels to 2/10 or less to improve tolerance to therapeutic exercise progressions.    2. ? ROM: IncreaseAROM limitations throughout L ankle to equal bilat to reduce difficulty with ADLs. 3. ? Strength: Increase BLE strength to 5/5 globally to improve safety and tolerance to prolonged ambulation and stair climbing tasks. 4. Pt to be independent and compliant with Home Exercise Programs  5. Demonstrate Knowledge of fall prevention     LTG: (to be met in 12 treatments)  1. Improve score on assessment tool LEFI from 4.8% to 2% or less. 2. Reduce L ankle pain levels to 0-1/10 to improve tolerance to all daily activities. 3. Pt to be able to ambulate at least 150 feet demonstrating safety awareness and improved gait mechanics including improved L heel strike, equal bilateral step length, and improved gait speed. 4. Pt to demonstrate improved L ankle stability with ability to maintain tandem stance with LLE behind for at least 20 seconds without LOB.                   Patient goals: reduce pain    Pt. Education:  [x] Yes  [] No  [x] Reviewed Prior HEP/Ed  Method of Education: [x] Verbal  [x] Demo  [] Written  7/26/21- HEP given of charted exercises, [employer] #[id number], fall risk prevention handout discussed and provided   Comprehension of Education:  [] Verbalizes understanding. [] Demonstrates understanding. [] Needs review. [x] Demonstrates/verbalizes HEP/Ed previously given. Plan: [x] Continue current frequency toward long and short term goals. [x] Specific Instructions for subsequent treatments: progress as able      Time In: 11:17 am            Time Out: 12:00 pm    Electronically signed by:   Nancy Rosales, PT

## 2021-08-10 ENCOUNTER — HOSPITAL ENCOUNTER (OUTPATIENT)
Dept: PHYSICAL THERAPY | Facility: CLINIC | Age: 81
Setting detail: THERAPIES SERIES
Discharge: HOME | End: 2021-08-10
Payer: MEDICARE

## 2021-08-10 PROCEDURE — 97110 THERAPEUTIC EXERCISES: CPT

## 2021-08-10 NOTE — FLOWSHEET NOTE
[] [facility]) - [facility] &  Therapy  [address]  [phone]  F: [phone] [x] [address]  [place] [address]   [address]  P: [phone]  F: [phone] [] [address] &  Therapy  [address]  P: [phone]  F: [phone] [] [address]  P: [phone]  F: [phone] [] [address]  [facility]   [address]   [place]: [phone]  F: [phone]      Physical Therapy Daily Treatment Note    Date:  8/10/2021  Patient Name:  Bozena Lemon    :  1940  MRN: [id number]  Physician: Bob Medina MD                           Insurance: Medicare A and B, [place] Medicare Supp   Medical Diagnosis: L58.333M- Sprain of anterior talofibular ligament of left ankle, initial encounter   Rehab Codes: M25.572, M25.37, M62.81, R26.89  Onset Date: 2021                                      Next 's appt. :   Visit# / total visits: 3/12; Progress note for Medicare patient due at visit 10     Cancels/No Shows: 0/0    Subjective:    Pain:  [] Yes  [x] No Location: L ankle Pain Rating: (0-10 scale) 0/10  Pain altered Tx:  [x] No  [] Yes  Action:    Comments: pt denies pain. Complaints that it is hard for him to pick his toes up and he often trips when walking.      Objective:  Modalities:   Precautions: chronic A-Fib, dizziness   Exercises:  Exercises:  Exercise       Reps/ Time Weight/ Level Comments   Nustep 5' Lv 3 Warm up- added 8/10/21             DF+ Inv stretch 3x30\"       Ankle Pumps 2x10  AA Gave manual assist into DF to achieve full ROM   Ankle Alphabet  1x       4 way ankle  10x2  Lime  progressed reps 8/10, decreased to orange tband for DF   Bridges 10x2 A  progressed reps 8/10   Hooklying Clamshells 10x2 Lime progressed reps 8/10 BAPS  15xea  L2  seated- A/P, M/L, clockwise/counter- progressed to level 2 8/10   Heel Raises  20x  3#  seated- increased weight 8/10   Toe Raises   20x    seated, cues for eccentric lowering              SB Stretch 3x30\"        Mini Squats 15x   Increased reps 8/10    Heel Raises  2x10        NBOS EC  2x30\"        NBOS EO  2x30\"    foam   Tandem Stance 2x30\"     Other:      Specific Instructions for next treatment: L ankle ROM, ankle stability and proprioception, progress hip and ankle strength per pt tolerance       Treatment Charges: Mins Units   []  Modalities     [x]  Ther Exercise 36 2   []  Manual Therapy     []  Ther Activities     []  Aquatics     []  Vasocompression     []  Other     Total Treatment time 36 2      Estimated medicare costs as of 08/10/21: $322.68    Assessment: [x] Progressing toward goals. Started with warm up on Nustep this date using LE only, followed by exercises. Pt with difficulty achieving full range DF this date, therefore PTA gave manual overpressure during ankle pumps and decreased to orange tband during 4 way ankle. Pt tolerated increased reps well and demonstrated good balance with all balance exercises, with no displacement. [] No change. [] Other:  [x] Patient would continue to benefit from skilled physical therapy services in order to: reduce L ankle pain, restore L ankle range of motion and BLE strength globally, and improve L ankle proprioception/stability to reduce gait and balance deficits and return pt to prior level of funciton. STG/LTG   STG: (to be met in 8 treatments)  1. ? Pain: Decrease L ankle pain levels to 2/10 or less to improve tolerance to therapeutic exercise progressions. 2. ? ROM: IncreaseAROM limitations throughout L ankle to equal bilat to reduce difficulty with ADLs. 3. ? Strength: Increase BLE strength to 5/5 globally to improve safety and tolerance to prolonged ambulation and stair climbing tasks.    4. Pt to be independent and compliant with Home Exercise Programs  5. Demonstrate Knowledge of fall prevention     LTG: (to be met in 12 treatments)  1. Improve score on assessment tool LEFI from 4.8% to 2% or less. 2. Reduce L ankle pain levels to 0-1/10 to improve tolerance to all daily activities. 3. Pt to be able to ambulate at least 150 feet demonstrating safety awareness and improved gait mechanics including improved L heel strike, equal bilateral step length, and improved gait speed. 4. Pt to demonstrate improved L ankle stability with ability to maintain tandem stance with LLE behind for at least 20 seconds without LOB.                   Patient goals: reduce pain    Pt. Education:  [x] Yes  [] No  [x] Reviewed Prior HEP/Ed  Method of Education: [] Verbal  [x] Demo  [] Written  7/26/21- HEP given of charted exercises, [employer] #[id number], fall risk prevention handout discussed and provided   Comprehension of Education:  [] Verbalizes understanding. [] Demonstrates understanding. [] Needs review. [x] Demonstrates/verbalizes HEP/Ed previously given. Plan: [x] Continue current frequency toward long and short term goals.     [x] Specific Instructions for subsequent treatments: progress as able      Time In: 5:30pm         Time Out: 6:11pm    Electronically signed by:  Barbie Gonzalez PTA

## 2021-08-12 ENCOUNTER — HOSPITAL ENCOUNTER (OUTPATIENT)
Dept: PHYSICAL THERAPY | Facility: CLINIC | Age: 81
Setting detail: THERAPIES SERIES
Discharge: HOME | End: 2021-08-12
Payer: MEDICARE

## 2021-08-12 PROCEDURE — 97110 THERAPEUTIC EXERCISES: CPT

## 2021-08-12 NOTE — FLOWSHEET NOTE
[] [facility]) - [facility]  [address]  [phone]  F: [phone] [x] [address]  [facility]   [address]  P: [phone]  F: [phone] [] [address] &  Therapy  [address]  P: [phone]  F: [phone] [] [address]  P: [phone]  F: [phone] [] [address]  [facility]   [address]   [place]: [phone]  F: [phone]      Physical Therapy Daily Treatment Note    Date:  2021  Patient Name:  Michelle Amador    :  1940  MRN: [id number]  Physician: Matilde Davis MD                           Insurance: Medicare A and B, [place] Medicare Supp   Medical Diagnosis: F81.532N- Sprain of anterior talofibular ligament of left ankle, initial encounter   Rehab Codes: M25.572, M25.37, M62.81, R26.89  Onset Date: 2021                                      Next 's appt. :   Visit# / total visits: ; Progress note for Medicare patient due at visit 10     Cancels/No Shows: 0/0    Subjective:    Pain:  [] Yes  [x] No Location: L ankle Pain Rating: (0-10 scale) 0/10  Pain altered Tx:  [x] No  [] Yes  Action:    Comments: Pt reports that he is feeling pretty good today. No complaint of pain in L ankle this date.      Objective:  Modalities:   Precautions: chronic A-Fib, dizziness   Exercises:  Exercises:  Exercise       Reps/ Time Weight/ Level Comments   Nustep 5' Lv 4 Warm up- added 8/10/21             DF+ Inv stretch 3x30\"       Ankle Pumps 2x10  AA Gave manual assist into DF to achieve full ROM   Ankle Alphabet  1x       4 way ankle  10x2  Lime  progressed reps 8/10, decreased to orange tband for DF   Bridges 10x2 A  progressed reps 8/10   Hooklying Clamshells 10x2 Lime progressed reps 8/10 BAPS  15xea  L2  seated- A/P, M/L, clockwise/counter- progressed to level 2 8/10   Heel Raises  20x  3#  seated- increased weight 8/10   Toe Raises   20x    seated, cues for eccentric lowering              SB Stretch 3x30\"        Mini Squats 15x   Increased reps 8/10   Eccentric SL heel raises 10x  Difficult- Additional cueing required to slowly lower   Step Ups 10x 6\" Fwd/lateral   Step Downs 10x 6\"     Heel Raises  2x10        NBOS EC  2x30\"        NBOS EO  2x30\"    foam   Tandem Stance  2x30\"   foam   LLE- heel toe progression 20x     Other:      Specific Instructions for next treatment: standing/dynamic hip strengthening, incorporate SL balance and strengthening exercises, restore L ankle DF range and strength       Treatment Charges: Mins Units   []  Modalities     [x]  Ther Exercise 37 2   []  Manual Therapy     []  Ther Activities     []  Aquatics     []  Vasocompression     []  Other     Total Treatment time 37 2      Estimated medicare costs as of 08/12/21: $374.73    Assessment: [x] Progressing toward goals. Started with warm up on Nustep this date. Continued with exercises per chart progressing balance with foam in tandem stance and increasing LLE strength globally with step ups, step downs, and eccentric SL heel raises. Pt requires additional cueing to perform exercises in a slow and controlled manner with difficulty eccentric phase of SL heel raise. Pt continues to present with difficulty achieving DF in LLE with limited range of motion and strength. Practiced heel-toe progression with LLE and educated pt on gait cycle and to reinforce this pattern at home to avoid tripping or catching L foot while walking at home. [] No change.      [] Other:  [x] Patient would continue to benefit from skilled physical therapy services in order to: reduce L ankle pain, restore L ankle range of motion and BLE strength globally, and improve L ankle proprioception/stability to reduce gait and balance deficits and return pt to prior level of funciton. STG/LTG   STG: (to be met in 8 treatments)  1. ? Pain: Decrease L ankle pain levels to 2/10 or less to improve tolerance to therapeutic exercise progressions. 2. ? ROM: IncreaseAROM limitations throughout L ankle to equal bilat to reduce difficulty with ADLs. 3. ? Strength: Increase BLE strength to 5/5 globally to improve safety and tolerance to prolonged ambulation and stair climbing tasks. 4. Pt to be independent and compliant with Home Exercise Programs  5. Demonstrate Knowledge of fall prevention     LTG: (to be met in 12 treatments)  1. Improve score on assessment tool LEFI from 4.8% to 2% or less. 2. Reduce L ankle pain levels to 0-1/10 to improve tolerance to all daily activities. 3. Pt to be able to ambulate at least 150 feet demonstrating safety awareness and improved gait mechanics including improved L heel strike, equal bilateral step length, and improved gait speed. 4. Pt to demonstrate improved L ankle stability with ability to maintain tandem stance with LLE behind for at least 20 seconds without LOB.                   Patient goals: reduce pain    Pt. Education:  [x] Yes  [] No  [x] Reviewed Prior HEP/Ed  Method of Education: [x] Verbal  [x] Demo  [] Written  7/26/21- HEP given of charted exercises, [employer] #[id number], fall risk prevention handout discussed and provided   8/12- educated pt on heel-toe progression during gait cycle, reinforced importance of HEP with improving L ankle DF ROM and strength to avoid tripping/catching foot at home   Comprehension of Education:  [x] Verbalizes understanding. [x] Demonstrates understanding. [] Needs review. [x] Demonstrates/verbalizes HEP/Ed previously given.      Plan: [x] Continue current frequency toward long and short term goals.  (pt notes he is unable to come into therapy next week but will call back to schedule next appointment)  [x] Specific Instructions for subsequent treatments: progress as able      Time In: 2:28 pm         Time Out:  3:10 pm    Electronically signed by:   Eufemia Pa PT

## 2021-09-15 ENCOUNTER — HOSPITAL ENCOUNTER (OUTPATIENT)
Dept: PHYSICAL THERAPY | Facility: CLINIC | Age: 81
Setting detail: THERAPIES SERIES
Discharge: HOME | End: 2021-09-15
Payer: MEDICARE

## 2021-09-15 PROCEDURE — 97110 THERAPEUTIC EXERCISES: CPT

## 2021-09-15 NOTE — FLOWSHEET NOTE
[] [facility]) - [facility] &  Therapy  [address]  [phone]  F: [phone] [x] [address]  [facility]   [address]  P: [phone]  F: [phone] [] [address] &  Therapy  [address]  P: [phone]  F: [phone] [] [address]  P: [phone]  F: [phone] [] [address]  [facility]   [address]   [place]: [phone]  F: [phone]      Physical Therapy Daily Treatment Note    Date:  9/15/2021  Patient Name:  Irene Mcnair    :  1940  MRN: [id number]  Physician: Kalee Mae MD                           Insurance: Medicare A and B, [employer] Medicare Supp   Medical Diagnosis: Z37.648H- Sprain of anterior talofibular ligament of left ankle, initial encounter   Rehab Codes: M25.572, M25.37, M62.81, R26.89  Onset Date: 2021                                      Next 's appt. :   Visit# / total visits: ; Progress note for Medicare patient due at visit 15     Cancels/No Shows: 0/0    Subjective:    Pain:  [x] Yes  [] No Location: L ankle Pain Rating: (0-10 scale) 6-7/10  Pain altered Tx:  [] No  [x] Yes  Action: minimal progressions this date, emphasized mat table exercises     Comments: Pt arrives with increased pain and continued numbness through anterior ankle/shin with increased tightness in L calf with spasms and difficulty walking. Pt recently got a cardioversion with ANNA MARIE in place 21 and has not been seen by therapy since .      Objective:  Modalities:   Precautions: chronic A-Fib, dizziness, cardioversion   Exercises:  Exercises:  Christina Grove indicates exercises performed 09/15/21  Exercise       Reps/ Time Weight/ Level Comments   Nustep 5' Lv 4 Warm up- added 8/10/21             DF+ Inv stretch 3x30\"       Ankle Pumps 2x10  AA Gave manual assist into DF to achieve full ROM   Ankle Alphabet  1x       4 way ankle  10x2  Lime  progressed reps 8/10,   Bridges 10x2 A  progressed reps 8/10   Hooklying Clamshells 10x2 Lime progressed reps 8/10               BAPS  15xea  L2  seated- A/P, M/L, clockwise/counter- progressed to level 2 8/10   Heel Raises  20x  3#  seated- increased weight 8/10   Toe Raises   20x    seated, cues for eccentric lowering   Toe Yoga  20xea       Domers 20xea     Towel Scrunches  20x     Windshield Wipers  20x            SB Stretch 3x30\"        Mini Squats 15x   Increased reps 8/10   Eccentric SL heel raises 10x  Difficult- Additional cueing required to slowly lower   Step Ups 10x 6\" Fwd/lateral   Step Downs 10x 6\"     Heel Raises  2x10        NBOS EC  2x30\"        NBOS EO  2x30\"    foam   Tandem Stance  2x30\"   foam   LLE- heel toe progression 20x     Other:      Specific Instructions for next treatment: standing/dynamic hip strengthening, incorporate SL balance and strengthening exercises, restore L ankle DF range and strength   Reassessed by primary PT 9/15 ROM  ° A/P     Left Right   Hip Flex       Ext       ER       IR       ABD       ADD       Knee Flex       Ext       Ankle DF 0 2   PF 45 48   INV 40 35   EVER 10 13             Reassessed by primary PT 9/15 STRENGTH     Left Right   Hip Flex 4+ 4+   Ext 4 4   ABD 4 4   ADD       Knee Flex 4+ 4+   Ext 4+ 4+   Ankle DF 4 4+   PF 4+ 4+   INV 4 4+   EVER 4+ 4+                   Treatment Charges: Mins Units   []  Modalities     [x]  Ther Exercise 41 3   []  Manual Therapy     []  Ther Activities     []  Aquatics     []  Vasocompression     []  Other     Total Treatment time 41 3      Estimated medicare costs as of 09/15/21: $449.62    Assessment: [x] Progressing toward goals. Reassessed progress towards goals this session as pt hasn't been seen by therapy since 8/12.  Pt presents with continued ankle pain and instability with improve tolerance to all daily activities. 3. Pt to be able to ambulate at least 150 feet demonstrating safety awareness and improved gait mechanics including improved L heel strike, equal bilateral step length, and improved gait speed. 4. Pt to demonstrate improved L ankle stability with ability to maintain tandem stance with LLE behind for at least 20 seconds without LOB.                   Patient goals: reduce pain    Pt. Education:  [x] Yes  [] No  [] Reviewed Prior HEP/Ed  Method of Education: [x] Verbal  [x] Demo  [x] Written  7/26/21- HEP given of charted exercises, [employer]bridge #[id number], fall risk prevention handout discussed and provided   8/12- educated pt on heel-toe progression during gait cycle, reinforced importance of HEP with improving L ankle DF ROM and strength to avoid tripping/catching foot at home   Access Code: [email or web address]  URL: [email or web address]  Date: 09/15/2021  Prepared by: Josue Stack    Exercises  Seated Great Toe Extension - 1 x daily - 7 x weekly - 2 sets - 10 reps  Seated Lesser Toes Extension - 1 x daily - 7 x weekly - 2 sets - 10 reps  Arch Lifting - 1 x daily - 7 x weekly - 2 sets - 10 reps    Comprehension of Education:  [x] Verbalizes understanding. [x] Demonstrates understanding. [x] Needs review. [] Demonstrates/verbalizes HEP/Ed previously given. Plan: [x] Continue current frequency toward long and short term goals. [x] Specific Instructions for subsequent treatments: progress as able      Time In: 3:00 pm         Time Out: 3:46 pm    Electronically signed by:   Eufemia Pa, PT

## 2021-09-22 ENCOUNTER — HOSPITAL ENCOUNTER (OUTPATIENT)
Dept: PHYSICAL THERAPY | Facility: CLINIC | Age: 81
Setting detail: THERAPIES SERIES
Discharge: HOME | End: 2021-09-22
Payer: MEDICARE

## 2021-09-22 PROCEDURE — 97110 THERAPEUTIC EXERCISES: CPT

## 2021-09-22 NOTE — FLOWSHEET NOTE
skilled physical therapy services in order to: reduce L ankle pain, restore L ankle range of motion and BLE strength globally, and improve L ankle proprioception/stability to reduce gait and balance deficits and return pt to prior level of funciton. STG/LTG   STG: (to be met in 8 treatments) Reassessed 9/15/21  1. ? Pain: Decrease L ankle pain levels to 2/10 or less to improve tolerance to therapeutic exercise progressions. Not met- pain increases up to 7/10 with cramps/spasms in L calf especially at night   2. ? ROM: IncreaseAROM limitations throughout L ankle to equal bilat to reduce difficulty with ADLs. Ongoing- limited primarily DF and eversion  3. ? Strength: Increase BLE strength to 5/5 globally to improve safety and tolerance to prolonged ambulation and stair climbing tasks. Progress Made  4. Pt to be independent and compliant with Home Exercise Programs NOT MET   5. Demonstrate Knowledge of fall prevention MET      LTG: (to be met in 12 treatments)  1. Improve score on assessment tool LEFI from 4.8% to 2% or less. 2. Reduce L ankle pain levels to 0-1/10 to improve tolerance to all daily activities. 3. Pt to be able to ambulate at least 150 feet demonstrating safety awareness and improved gait mechanics including improved L heel strike, equal bilateral step length, and improved gait speed. 4. Pt to demonstrate improved L ankle stability with ability to maintain tandem stance with LLE behind for at least 20 seconds without LOB.                   Patient goals: reduce pain    Pt.  Education:  [x] Yes  [] No  [] Reviewed Prior HEP/Ed  Method of Education: [x] Verbal  [x] Demo  [x] Written  7/26/21- HEP given of charted exercises, [employer] #[id number], fall risk prevention handout discussed and provided   8/12- educated pt on heel-toe progression during gait cycle, reinforced importance of HEP with improving L ankle DF ROM and strength to avoid tripping/catching foot at home   Access Code: [email or web address]  URL: [email or web address]  Date: 09/15/2021  Prepared by: Patricia Ramirez    Exercises  Seated Great Toe Extension - 1 x daily - 7 x weekly - 2 sets - 10 reps  Seated Lesser Toes Extension - 1 x daily - 7 x weekly - 2 sets - 10 reps  Arch Lifting - 1 x daily - 7 x weekly - 2 sets - 10 reps    Comprehension of Education:  [x] Verbalizes understanding. [x] Demonstrates understanding. [x] Needs review. [] Demonstrates/verbalizes HEP/Ed previously given. Plan: [x] Continue current frequency toward long and short term goals.    [x] Specific Instructions for subsequent treatments: progress as able      Time In: 4:18 pm         Time Out: 5:00 pm    Electronically signed by:  Stephania Peterson PT

## 2021-09-24 ENCOUNTER — HOSPITAL ENCOUNTER (OUTPATIENT)
Dept: PHYSICAL THERAPY | Facility: CLINIC | Age: 81
Setting detail: THERAPIES SERIES
Discharge: HOME | End: 2021-09-24
Payer: MEDICARE

## 2021-09-24 PROCEDURE — 97110 THERAPEUTIC EXERCISES: CPT

## 2021-09-24 NOTE — FLOWSHEET NOTE
[] [facility]) - [facility] &  Therapy  [address]  [phone]  F: [phone] [x] [address]  [employer] [address]   [address]  P: [phone]  F: [phone] [] [address] &  Therapy  [address]  P: [phone]  F: [phone] [] [address]  P: [phone]  F: [phone] [] [address]  [facility]   [address]   [place]: [phone]  F: [phone]      Physical Therapy Daily Treatment Note    Date:  2021  Patient Name:  Yani Camargo    :  1940  MRN: [id number]  Physician: Racquel Swan MD                           Insurance: Medicare A and B, [place] Medicare Supp   Medical Diagnosis: Q97.639C- Sprain of anterior talofibular ligament of left ankle, initial encounter   Rehab Codes: M25.572, M25.37, M62.81, R26.89  Onset Date: 2021                                      Next 's appt. :   Visit# / total visits: ; Progress note for Medicare patient due at visit 15     Cancels/No Shows: 0/0    Subjective:    Pain:  [x] Yes  [] No Location: L ankle Pain Rating: (0-10 scale) 0/10  Pain altered Tx:  [x] No  [] Yes  Action:    Comments: Pt arrives without complaint of L ankle pain or soreness this date. Pt does describe increased soreness primarily in bilateral knees following previous session.      Objective:  Modalities:   Precautions: chronic A-Fib, dizziness, cardioversion , monitor HR through out session  Exercises:  Exercises:  Milagro Richter indicates exercises performed 21  Exercise       Reps/ Time Weight/ Level Comments   Nustep 5' Lv 5 Warm up- added 8/10/21             DF+ Inv stretch 3x30\"       Ankle Pumps 2x10  AA Gave manual assist into DF to achieve full ROM   Ankle Alphabet  1x       4 way ankle  10x2  Lime  progressed reps 8/10,   Bridges 10x2 A  progressed reps 8/10   Hooklying Clamshells 10x2 Lime progressed reps 8/10               BAPS  15xea  L2  seated- A/P, M/L, clockwise/counter- progressed to level 2 8/10   Heel Raises  20x    seated- increased weight 8/10   Toe Raises   20x    seated, cues for eccentric lowering   Toe Yoga  20xea       Domers 20xea     Towel Scrunches  20x     Windshield Wipers  20x           SB Stretch 3x30\"        Mini Squats 15x   Increased reps 8/10   Sit to stands x10  CGA    Heel raises  3x10   Between each SB stretch    Eccentric SL heel raises 10x  Difficult- Additional cueing required to slowly lower   SL heel raise 10x  UE support    Step Ups 10x 6\" Fwd/lateral   Step Downs 10x 6\"     NBOS EC  2x30\"        NBOS EO  2x30\"    foam   Tandem Stance  2x30\"   foam   LLE- heel toe progression 20x     Other:        Specific Instructions for next treatment: standing/dynamic hip strengthening, incorporate SL balance and strengthening exercises, restore L ankle DF range and strength     Treatment Charges: Mins Units   []  Modalities     [x]  Ther Exercise 40 3   []  Manual Therapy     []  Ther Activities     []  Aquatics     []  Vasocompression     []  Other     Total Treatment time 40 3      Estimated medicare costs as of 09/24/21: $599.40    Assessment: [x] Progressing toward goals. Initiated session with nustep warm up followed by charted exercises. Pt HR monitored at 110 bpm following warm up. Performed seated exercises progressing 4 way ankle with blue tband. Added SL heel raises this date. Cues given during heel raises to avoid inversion with PF with fair carryover. Pt is demonstrating improvement in ankle DF however it continues to fatigue quickly. Overall good tolerance to treatment. HR between 65-123bpm throughout session without complaint of cardiac symptoms. [] No change.      [] Other:  [x] Patient would continue to benefit from skilled physical therapy services in order to: reduce L ankle pain, restore L ankle range of motion and BLE strength globally, and improve L ankle proprioception/stability to reduce gait and balance deficits and return pt to prior level of funciton. STG/LTG   STG: (to be met in 8 treatments) Reassessed 9/15/21  1. ? Pain: Decrease L ankle pain levels to 2/10 or less to improve tolerance to therapeutic exercise progressions. Not met- pain increases up to 7/10 with cramps/spasms in L calf especially at night   2. ? ROM: IncreaseAROM limitations throughout L ankle to equal bilat to reduce difficulty with ADLs. Ongoing- limited primarily DF and eversion  3. ? Strength: Increase BLE strength to 5/5 globally to improve safety and tolerance to prolonged ambulation and stair climbing tasks. Progress Made  4. Pt to be independent and compliant with Home Exercise Programs NOT MET   5. Demonstrate Knowledge of fall prevention MET      LTG: (to be met in 12 treatments)  1. Improve score on assessment tool LEFI from 4.8% to 2% or less. 2. Reduce L ankle pain levels to 0-1/10 to improve tolerance to all daily activities. 3. Pt to be able to ambulate at least 150 feet demonstrating safety awareness and improved gait mechanics including improved L heel strike, equal bilateral step length, and improved gait speed. 4. Pt to demonstrate improved L ankle stability with ability to maintain tandem stance with LLE behind for at least 20 seconds without LOB.                   Patient goals: reduce pain    Pt. Education:  [x] Yes  [] No  [x] Reviewed Prior HEP/Ed  Method of Education: [x] Verbal  [x] Demo  [] Written  7/26/21- HEP given of charted exercises, [employer] #[id number], fall risk prevention handout discussed and provided   8/12- educated pt on heel-toe progression during gait cycle, reinforced importance of HEP with improving L ankle DF ROM and strength to avoid tripping/catching foot at home   Access Code: [email or web address]  URL: [email or web address]  Date:

## 2021-09-29 ENCOUNTER — HOSPITAL ENCOUNTER (OUTPATIENT)
Dept: PHYSICAL THERAPY | Facility: CLINIC | Age: 81
Setting detail: THERAPIES SERIES
Discharge: HOME | End: 2021-09-29
Payer: MEDICARE

## 2021-09-29 PROCEDURE — 97110 THERAPEUTIC EXERCISES: CPT

## 2021-09-29 NOTE — FLOWSHEET NOTE
[] [facility]) - [facility] &  Therapy  [address]  [phone]  F: [phone] [x] [address]  [facility]   [address]  P: [phone]  F: [phone] [] [address] &  Therapy  [address]  P: [phone]  F: [phone] [] [address]  P: [phone]  F: [phone] [] [address]  [facility]   [address]   [place]: [phone]  F: [phone]      Physical Therapy Daily Treatment Note    Date:  2021  Patient Name:  Paz Nino    :  1940  MRN: [id number]  Physician: Meena Rivera MD                           Insurance: Medicare A and B, [place] Medicare Supp   Medical Diagnosis: F99.258Z- Sprain of anterior talofibular ligament of left ankle, initial encounter   Rehab Codes: M25.572, M25.37, M62.81, R26.89  Onset Date: 2021                                      Next 's appt. :   Visit# / total visits: ; Progress note for Medicare patient due at visit 15     Cancels/No Shows: 0/0    Subjective:    Pain:  [x] Yes  [] No Location: L ankle Pain Rating: (0-10 scale) 0/10  Pain altered Tx:  [x] No  [] Yes  Action:    Comments: Pt arrives with no pain to his ankles or knees this date. Patient reports that he had a discussion with his cardiologist regarding physical therapy, and patient reports that his cardiologist has no precautions.       Objective:  Modalities:   Precautions: chronic A-Fib, dizziness, cardioversion , monitor HR through out session  Exercises:  Exercises:  Sari Yin indicates exercises performed 21  Exercise       Reps/ Time Weight/ Level Comments   Nustep 5' Lv 5 Warm up- added 8/10/21             DF+ Inv stretch 3x30\"       Ankle Pumps 2x10  AA Gave manual assist into DF to achieve full Other:  [x] Patient would continue to benefit from skilled physical therapy services in order to: reduce L ankle pain, restore L ankle range of motion and BLE strength globally, and improve L ankle proprioception/stability to reduce gait and balance deficits and return pt to prior level of funciton. STG/LTG   STG: (to be met in 8 treatments) Reassessed 9/15/21  1. ? Pain: Decrease L ankle pain levels to 2/10 or less to improve tolerance to therapeutic exercise progressions. Not met- pain increases up to 7/10 with cramps/spasms in L calf especially at night   2. ? ROM: IncreaseAROM limitations throughout L ankle to equal bilat to reduce difficulty with ADLs. Ongoing- limited primarily DF and eversion  3. ? Strength: Increase BLE strength to 5/5 globally to improve safety and tolerance to prolonged ambulation and stair climbing tasks. Progress Made  4. Pt to be independent and compliant with Home Exercise Programs NOT MET   5. Demonstrate Knowledge of fall prevention MET      LTG: (to be met in 12 treatments)  1. Improve score on assessment tool LEFI from 4.8% to 2% or less. 2. Reduce L ankle pain levels to 0-1/10 to improve tolerance to all daily activities. 3. Pt to be able to ambulate at least 150 feet demonstrating safety awareness and improved gait mechanics including improved L heel strike, equal bilateral step length, and improved gait speed. 4. Pt to demonstrate improved L ankle stability with ability to maintain tandem stance with LLE behind for at least 20 seconds without LOB.                   Patient goals: reduce pain    Pt.  Education:  [x] Yes  [] No  [x] Reviewed Prior HEP/Ed  Method of Education: [x] Verbal  [x] Demo  [] Written  7/26/21- HEP given of charted exercises, [employer] #[id number], fall risk prevention handout discussed and provided   8/12- educated pt on heel-toe progression during gait cycle, reinforced importance of HEP with improving L ankle DF ROM and strength to avoid tripping/catching foot at home   Access Code: Kenny Wells  URL: [email or web address]  Date: 09/15/2021  Prepared by: Jayne Monzon    Exercises  Seated Great Toe Extension - 1 x daily - 7 x weekly - 2 sets - 10 reps  Seated Lesser Toes Extension - 1 x daily - 7 x weekly - 2 sets - 10 reps  Arch Lifting - 1 x daily - 7 x weekly - 2 sets - 10 reps     9/24- educated pt on A-fib and requesting clearance by cardiologist/physician for physical therapy due to continued A-Fib and plans for another upcoming ablation. Comprehension of Education:   [x] Verbalizes understanding. [x] Demonstrates understanding. [] Needs review. [x] Demonstrates/verbalizes HEP/Ed previously given. Plan: [x] Continue current frequency toward long and short term goals.    [x] Specific Instructions for subsequent treatments: progress as able      Time In: 2:05 pm         Time Out: 2:50 pm    Electronically signed by:  Nichelle Gamez, PT

## 2021-10-06 ENCOUNTER — HOSPITAL ENCOUNTER (OUTPATIENT)
Dept: PHYSICAL THERAPY | Facility: CLINIC | Age: 81
Setting detail: THERAPIES SERIES
Discharge: HOME | End: 2021-10-06
Payer: MEDICARE

## 2021-10-06 PROCEDURE — 97112 NEUROMUSCULAR REEDUCATION: CPT

## 2021-10-06 PROCEDURE — 97110 THERAPEUTIC EXERCISES: CPT

## 2021-10-06 NOTE — FLOWSHEET NOTE
[] [facility]) - [facility] &  Therapy  [address]  [phone]  F: [phone] [x] [address]  [facility]   [address]  P: [phone]  F: [phone] [] [address] &  Therapy  [address]  P: [phone]  F: [phone] [] [address]  P: [phone]  F: [phone] [] [address]  [facility]   [address]   [place]: [phone]  F: [phone]      Physical Therapy Daily Treatment Note    Date:  10/6/2021  Patient Name:  Maye Duarte    :  1940  MRN: [id number]  Physician: Radha Fairbanks MD                           Insurance: Medicare A and B, [place] Medicare Supp   Medical Diagnosis: S01.333M- Sprain of anterior talofibular ligament of left ankle, initial encounter   Rehab Codes: M25.572, M25.37, M62.81, R26.89  Onset Date: 2021                                      Next 's appt. :   Visit# / total visits: ; Progress note for Medicare patient due at visit 15     Cancels/No Shows: 0/0    Subjective:    Pain:  [x] Yes  [] No Location: L ankle Pain Rating: (0-10 scale) 0/10  Pain altered Tx:  [x] No  [] Yes  Action:  Comments: Pt arrives with no pain complaints.      Objective:  Modalities:   Precautions: chronic A-Fib, dizziness, cardioversion , monitor HR through out session  Exercises:  Selin Mensah indicates exercises performed 10/06/21  Exercise       Reps/ Time Weight/ Level Comments   Nustep 5' Lv 5 Warm up- added 8/10/21             DF+ Inv stretch 3x30\"       Ankle Pumps 2x10  AA Gave manual assist into DF to achieve full ROM   Ankle Alphabet  1x       4 way ankle  10x2 Lime  progressed reps 8/10,   Bridges 10x2 A  progressed reps 810   Bridges on [place]  x10  A    Hooklying Clamshells 10x2 Lime progressed reps 8/10   Sidelying clamshells  x20  Lime Added 10/6/21         BAPS  15xea  L2  seated- A/P, M/L, clockwise/counter- progressed to level 2 8/10   Heel Raises  20x    seated- increased weight 8/10   Toe Raises   20x    seated, cues for eccentric lowering   Toe Yoga  20xea       Domers 20xea     Towel Scrunches  20x     Windshield Wipers  20x           SB Stretch 3x30\"       Squats x20    In // bars    Sit to stands x20     Heel raises  2x10   Between each SB stretch    Eccentric SL heel raises 10x  Difficult- Additional cueing required to slowly lower   SL heel raise 10x  UE support    Step Ups 10x 6\" Fwd/lateral   Step Downs 10x 6\"     NBOS EC  2x30\"        NBOS EO  2x30\"    foam   Tandem Stance  2x30\"   foam   SLS  10\"     Alternating Marching  x10  No UE support    3 way hip- bilat  x12 Orange  Decreased UE use         LLE- heel toe progression 20x     Other:        Specific Instructions for next treatment: standing/dynamic hip strengthening, incorporate SL balance and strengthening exercises, restore L ankle DF range and strength     Treatment Charges: Mins Units   []  Modalities     [x]  Ther Exercise 45 3   []  Manual Therapy     []  Ther Activities     []  Aquatics     []  Vasocompression     [x]  Other: Neuro re-ed 10 1   Total Treatment time 55 4      Estimated medicare costs as of 10/06/21: $772.02    Assessment: [x] Progressing toward goals. Initiated session with nustep warm up followed by charted exercises. Incorporated more standing exercises this date to improve hip strength and challenge his balance. Patient demonstrates good technique with exercises throughout session. Minimal cueing on 3-way hip required. Patient has no increased pain. He is talkative throughout session. Equal LE weight bearing with squats. Discussed discharge next week, and patient is agreeable. [] No change.      [] Other:  [x] Patient would continue to benefit from skilled physical therapy services in order to: reduce L ankle pain, restore L ankle range of motion and BLE strength globally, and improve L ankle proprioception/stability to reduce gait and balance deficits and return pt to prior level of funciton. STG/LTG   STG: (to be met in 8 treatments) Reassessed 9/15/21  1. ? Pain: Decrease L ankle pain levels to 2/10 or less to improve tolerance to therapeutic exercise progressions. Not met- pain increases up to 7/10 with cramps/spasms in L calf especially at night   2. ? ROM: IncreaseAROM limitations throughout L ankle to equal bilat to reduce difficulty with ADLs. Ongoing- limited primarily DF and eversion  3. ? Strength: Increase BLE strength to 5/5 globally to improve safety and tolerance to prolonged ambulation and stair climbing tasks. Progress Made  4. Pt to be independent and compliant with Home Exercise Programs NOT MET   5. Demonstrate Knowledge of fall prevention MET      LTG: (to be met in 12 treatments)  1. Improve score on assessment tool LEFI from 4.8% to 2% or less. 2. Reduce L ankle pain levels to 0-1/10 to improve tolerance to all daily activities. 3. Pt to be able to ambulate at least 150 feet demonstrating safety awareness and improved gait mechanics including improved L heel strike, equal bilateral step length, and improved gait speed. 4. Pt to demonstrate improved L ankle stability with ability to maintain tandem stance with LLE behind for at least 20 seconds without LOB.                   Patient goals: reduce pain    Pt. Education:  [x] Yes  [] No  [x] Reviewed Prior HEP/Ed  Method of Education: [x] Verbal  [x] Demo  [] Written  7/26/21- HEP given of charted exercises, [employer] #[id number], fall risk prevention handout discussed and provided   8/12- educated pt on heel-toe progression during gait cycle, reinforced importance of HEP with improving L ankle DF ROM and strength to avoid tripping/catching foot at home   Access Code: [email or web address]  URL: [email or web address]  Date: 09/15/2021  Prepared by: Litzy Marroquin    Exercises  Seated Great Toe Extension - 1 x daily - 7 x weekly - 2 sets - 10 reps  Seated Lesser Toes Extension - 1 x daily - 7 x weekly - 2 sets - 10 reps  Arch Lifting - 1 x daily - 7 x weekly - 2 sets - 10 reps     9/24- educated pt on A-fib and requesting clearance by cardiologist/physician for physical therapy due to continued A-Fib and plans for another upcoming ablation. Comprehension of Education:   [x] Verbalizes understanding. [x] Demonstrates understanding. [] Needs review. [x] Demonstrates/verbalizes HEP/Ed previously given. Plan: [x] Continue current frequency toward long and short term goals.    [x] Specific Instructions for subsequent treatments: progress as able, d/c next week       Time In: 2:00 pm         Time Out: 3:00 pm    Electronically signed by:  Víctor Verdugo, PT

## 2021-10-08 ENCOUNTER — HOSPITAL ENCOUNTER (OUTPATIENT)
Dept: PHYSICAL THERAPY | Facility: CLINIC | Age: 81
Setting detail: THERAPIES SERIES
Discharge: HOME | End: 2021-10-08
Payer: MEDICARE

## 2021-10-08 PROCEDURE — 97110 THERAPEUTIC EXERCISES: CPT

## 2021-10-08 NOTE — FLOWSHEET NOTE
Hooklying Clamshells 10x2 Lime progressed reps 8/10   Sidelying clamshells  x20  Lime Added 10/6/21         BAPS  15xea  L2  seated- A/P, M/L, clockwise/counter- progressed to level 2 8/10   Heel Raises  20x    seated- increased weight 8/10   Toe Raises   20x    seated, cues for eccentric lowering   Toe Yoga  20xea       Domers 20xea     Towel Scrunches  20x     Windshield Wipers  20x           SB Stretch 3x30\"       Squats x20    In // bars    Sit to stands x20     Heel raises  2x10   Between each SB stretch    Eccentric SL heel raises 10x  Difficult- Additional cueing required to slowly lower   SL heel raise 15x  UE support    Step Ups 10x 6\" Fwd/lateral   Step Downs 10x 6\"     NBOS EC  2x30\"        NBOS EO  2x30\"    foam   Tandem Stance  2x30\"   foam   SLS  4x10\"     Alternating Marching  x10  No UE support    3 way hip- bilat  x15 Orange  Decreased UE use         LLE- heel toe progression 20x     Other:        Specific Instructions for next treatment: standing/dynamic hip strengthening, incorporate SL balance and strengthening exercises, restore L ankle DF range and strength     Treatment Charges: Mins Units   []  Modalities     [x]  Ther Exercise 39 3   []  Manual Therapy     []  Ther Activities     []  Aquatics     []  Vasocompression     [x]  Other: Neuro re-ed 10 -   Total Treatment time 49 3      Estimated medicare costs as of 10/08/21: $846.91    Assessment: [x] Progressing toward goals. Continued to monitor HR throughout treatment with HR at 85bpm before starting warm up and 101bpm at end of treatment. Initiated session with tayler to warm up followed by standing exercises with overall good tolerance of all exercises. Continued to work on balance and L LE single leg stance to progress weight bearing through L ankle with good tolerance and slight lateral sway noted but no loss of balance.  Encouraged pt to continue with daily compliance of HEP to continue to progress ankle strength, pt verbalizes understanding. [] No change. [] Other:  [x] Patient would continue to benefit from skilled physical therapy services in order to: reduce L ankle pain, restore L ankle range of motion and BLE strength globally, and improve L ankle proprioception/stability to reduce gait and balance deficits and return pt to prior level of funciton. STG/LTG   STG: (to be met in 8 treatments) Reassessed 9/15/21  1. ? Pain: Decrease L ankle pain levels to 2/10 or less to improve tolerance to therapeutic exercise progressions. Not met- pain increases up to 7/10 with cramps/spasms in L calf especially at night   2. ? ROM: IncreaseAROM limitations throughout L ankle to equal bilat to reduce difficulty with ADLs. Ongoing- limited primarily DF and eversion  3. ? Strength: Increase BLE strength to 5/5 globally to improve safety and tolerance to prolonged ambulation and stair climbing tasks. Progress Made  4. Pt to be independent and compliant with Home Exercise Programs NOT MET   5. Demonstrate Knowledge of fall prevention MET      LTG: (to be met in 12 treatments)  1. Improve score on assessment tool LEFI from 4.8% to 2% or less. 2. Reduce L ankle pain levels to 0-1/10 to improve tolerance to all daily activities. 3. Pt to be able to ambulate at least 150 feet demonstrating safety awareness and improved gait mechanics including improved L heel strike, equal bilateral step length, and improved gait speed. 4. Pt to demonstrate improved L ankle stability with ability to maintain tandem stance with LLE behind for at least 20 seconds without LOB.                   Patient goals: reduce pain    Pt.  Education:  [x] Yes  [] No  [x] Reviewed Prior HEP/Ed  Method of Education: [x] Verbal  [x] Demo  [] Written  7/26/21- HEP given of charted exercises, [employer] #[id number], fall risk prevention handout discussed and provided   8/12- educated pt on heel-toe progression during gait cycle, reinforced importance of HEP with improving L ankle DF ROM and strength to avoid tripping/catching foot at home   Access Code: [email or web address]  URL: [email or web address]  Date: 09/15/2021  Prepared by: Faustino Cutting    Exercises  Seated Great Toe Extension - 1 x daily - 7 x weekly - 2 sets - 10 reps  Seated Lesser Toes Extension - 1 x daily - 7 x weekly - 2 sets - 10 reps  Arch Lifting - 1 x daily - 7 x weekly - 2 sets - 10 reps     9/24- educated pt on A-fib and requesting clearance by cardiologist/physician for physical therapy due to continued A-Fib and plans for another upcoming ablation. Comprehension of Education:   [x] Verbalizes understanding. [x] Demonstrates understanding. [] Needs review. [x] Demonstrates/verbalizes HEP/Ed previously given. Plan: [x] Continue current frequency toward long and short term goals.    [x] Specific Instructions for subsequent treatments: progress as able, d/c next week       Time In: 12:58 pm         Time Out: 1:52 pm    Electronically signed by:  Favian Castellon PTA

## 2021-10-14 ENCOUNTER — HOSPITAL ENCOUNTER (OUTPATIENT)
Dept: PHYSICAL THERAPY | Facility: CLINIC | Age: 81
Setting detail: THERAPIES SERIES
Discharge: HOME | End: 2021-10-14
Payer: MEDICARE

## 2021-10-14 PROCEDURE — 97116 GAIT TRAINING THERAPY: CPT

## 2021-10-14 PROCEDURE — 97112 NEUROMUSCULAR REEDUCATION: CPT

## 2021-10-14 PROCEDURE — 97110 THERAPEUTIC EXERCISES: CPT

## 2021-10-14 NOTE — FLOWSHEET NOTE
[] [facility]) - [facility] &  Therapy  [address]  [phone]  F: [phone] [x] [address]  [employer] [address]   [address]  P: [phone]  F: [phone] [] [address] &  Therapy  [address]  P: [phone]  F: [phone] [] [address]  P: [phone]  F: [phone] [] [address]  [facility]   [address]   [place]: [phone]  F: [phone]      Physical Therapy Progress Note    Date: 10/14/2021      Patient: Rubina Monge  : 1940  MRN: [id number]    Physician: VALERIY GrandaS: Medicare A and B, [facility] Medicare Supp   Medical Diagnosis: S93.492A- Sprain of anterior talofibular ligament of left ankle, initial encounter   Rehab Codes: M25.572, M25.37, M62.81, R26.89  Onset Date: 2021                                      Next 's appt. :   Visit# / total visits: ; Progress note for Medicare patient due at visit 15                                  Cancels/No Shows: 0/0  Date range of services: 21 to 10/14/21    Subjective:    Pain:  [x]? Yes  []? No   Location: L ankle         Pain Rating: (0-10 scale) 0/10  Pain altered Tx:  [x]? No  []? Yes  Action:  Comments: Pt arrives to therapy without complaint of pain but does continue to report numbness and drop foot with difficulty clearing his L foot occasionally. Pt reports that he would like to continue with therapy to work towards restoring L ankle strength and stability and reduce incidence of tripping over L foot.  Pt requests to transfer to [facility] for proximity to home.      Objective:  Test Measurements:  Reassessed by primary PT 10/14 STRENGTH     Left Right   Hip Flex 5 5   Ext 4 4   ABD 4 4   ADD       Knee Flex 5 5   Ext 5 5   Ankle DF 4 5   PF 4+ 5   INV 4+ 5   EVER 4+ 5                   Reassessed by primary PT 10/14 ROM  ° A/P     Left Right   Hip Flex       Ext       ER       IR       ABD       ADD       Knee Flex       Ext       Ankle DF 3 10   PF 45 48   INV 40 35   EVER 10 13               Function:   LEFI: 17.5%    Assessment:  [x]? Progressing toward goals. Initiated session with nustep warm up followed by reassessment of progress towards goals. Pt continues to be most limited by L ankle DF range of motion and strength, L ankle stability, and gait. Pt demonstrates increased difficulty with L ankle DF and foot clearance with increased fatigue during ambulation and stair climbing. Pt noted compensating with L foot ER, circumduction, and occasionally increased L hip flexion to allow for L foot clearance. Cues given to patient to emphasize heel-toe gait pattern with fair carryover. Pt does demonstrate improvements in balance generally but continues to present with increased L ankle instability when compared to RLE. Pt would benefit from continued physical therapy services in order to further improve L ankle DF strength and endurance, L ankle stability, and gait to improve safety with community and household ambulation. []? No change. []? Other:  [x]? Patient would continue to benefit from skilled physical therapy services in order to: reduce L ankle pain, restore L ankle range of motion and BLE strength globally, and improve L ankle proprioception/stability to reduce gait and balance deficits and return pt to prior level of funciton.       STG/LTG   STG: (to be met in 8 treatments) Reassessed 10/14/21  1. ? Pain: Decrease L ankle pain levels to 2/10 or less to improve tolerance to therapeutic exercise progressions.  MET  2. ? ROM: Increase AROM limitations throughout L ankle to equal bilat to reduce difficulty with ADLs.  Ongoing- limited primarily DF and eversion  3. ? Strength: Increase BLE strength to 5/5 globally to improve safety and tolerance to prolonged ambulation and stair climbing tasks. Progress Made  4. Pt to be independent and compliant with Home Exercise Programs NOT MET   5. Demonstrate Knowledge of fall prevention MET      LTG: (to be met in 12 treatments) Reassessed 10/14/21  1. Improve score on assessment tool LEFI from 4.8% to 2% or less. Not met- 17.5% this date  2. Reduce L ankle pain levels to 0-1/10 to improve tolerance to all daily activities. Partially met- minimal pain in L ankle. Pt does complain of L calf cramps/spasms at night occasionally   3. Pt to be able to ambulate at least 150 feet demonstrating safety awareness and improved gait mechanics including improved L heel strike, equal bilateral step length, and improved gait speed. Ongoing- pt continues to demonstrate difficulty with L foot clearance with prolonged walking, compensations noted including L foot ER, excess L hip flexion, and LLE circumduction as pt began fatiguing during gait.    4. Pt to demonstrate improved L ankle stability with ability to maintain tandem stance with LLE behind for at least 20 seconds without LOB. Progress made- pt able to maintain tandem stance but does continue to present with increased anterior-posterior postural sway and 1x use of UEs at 15 seconds.                      Patient goals: reduce pain    Treatment Plan:  [x] Therapeutic Exercise   28601  [] Iontophoresis: 4 mg/mL Dexamethasone Sodium Phosphate  mAmin  98056   [] Therapeutic Activity  48179 [] Vasopneumatic cold with compression  72484    [x] Gait Training   95111 [] Ultrasound   25746   [x] Neuromuscular Re-education  94670 [] Electrical Stimulation Unattended  79403   [x] Manual Therapy  24683 [] Electrical Stimulation Attended  94423   [x] Instruction in HEP  [] Lumbar/Cervical Traction  04176   [] Aquatic Therapy   11552 [] Cold/hotpack    [] Massage   60036      [] Dry Needling, 1 or 2 muscles  16077   [] Biofeedback, first 15 minutes   14371  [] Biofeedback, additional 15 minutes   17182 [] Dry Needling, 3 or more muscles  11946       Patient Status:     [] Continue per initial plan of care. [x] Additional visits necessary. [] Other:     Requested Frequency/Duration: 1-2 times per week for 6 additional treatments (18 visits total). Electronically signed by Franchesca Jeffery PT on 10/14/2021 at 6:25 PM      If you have any questions or concerns, please don't hesitate to call. Thank you for your referral.    Physician Signature:________________________________Date:__________________  By signing above or cosigning this note, I have reviewed this plan of care and certify a need for medically necessary rehabilitation services.      *PLEASE SIGN ABOVE AND FAX BACK ALL PAGES*

## 2021-10-14 NOTE — CARE COORDINATION
[] [facility] &  Therapy  [address]  [phone]  F: [phone] [x] [address] [facility] [address]   [address]  P: [phone]  F: [phone] [] [place]  [address] [facility]  P: [phone]  F: [phone] [] [address]  [facility]   [address]   [place]: [phone]  F: [phone]     THERAPY RESPONSIBILITY OF CARE TRANSFER FORM       PATIENT NAME: Adriane Madison  MRN: [id number]   : 1940      TRANSFERRING FACILITY:    [] [facility]   [] [facility]   [x]  [facility]   [] [facility]   [] [facility]   [] [facility]   [] [facility]  [] [facility]   [] Other:       ACCEPTING FACILITY   [] [facility]   [] [facility]   []  [facility]   [] [facility]   [] [facility]   [x] [facility]   [] [address] [facility]  [] [facility]   [] Other:          REASON FOR TRANSFER: pt request, proximity to pt home       TRANSFER OF CARE:    I am transferring the care of the above patient to: KIRK Durán PT  10/14/2021      ACCEPTANCE OF CARE:     I am accepting the care of the above patient.  Nelda Mejia, PT

## 2021-10-14 NOTE — FLOWSHEET NOTE
[] [facility]) - [facility] &  Therapy  [address]  [phone]  F: [phone] [x] [address]  [facility] [place] [facility]   [address]  P: [phone]  F: [phone] [] [address] &  Therapy  [address] [facility]  P: [phone]  F: [phone] [] [address]  P: [phone]  F: [phone] [] [address]  [facility]   [address]   [place]: [phone]  F: [phone]      Physical Therapy Daily Treatment Note    Date:  10/14/2021  Patient Name:  Faisal Sánchez    :  1940  MRN: [id number]  Physician: Irma Phoenix, MD                           Insurance: Medicare A and B, [place] Medicare Supp   Medical Diagnosis: C32.873X- Sprain of anterior talofibular ligament of left ankle, initial encounter   Rehab Codes: M25.572, M25.37, M62.81, R26.89  Onset Date: 2021                                      Next 's appt. :   Visit# / total visits: ; Progress note for Medicare patient due at visit 15     Cancels/No Shows: 0/0    Subjective:    Pain:  [x] Yes  [] No Location: L ankle Pain Rating: (0-10 scale) 0/10  Pain altered Tx:  [x] No  [] Yes  Action:  Comments: Pt arrives to therapy without complaint of pain but does continue to report numbness and drop foot with difficulty clearing his L foot occasionally. Pt reports that he would like to continue with therapy to work towards restoring L ankle strength and stability and reduce incidence of tripping over L foot. Pt requests to transfer to [facility] for proximity to home.      Objective:  Modalities:   Precautions: chronic A-Fib, dizziness, cardioversion , monitor HR through out session  Exercises:  An Mealing indicates exercises performed 10/14/21  Exercise       Reps/ Time Weight/ Level Ther Exercise 34 1   []  Manual Therapy     []  Ther Activities     []  Aquatics     []  Vasocompression     [x]  Other: Neuro re-ed 10 1   Gait 8 1   Total Treatment time 52 3      Estimated medicare costs as of 10/14/21: $924.98    Assessment: [x] Progressing toward goals. Initiated session with nustep warm up followed by reassessment of progress towards goals. Pt continues to be most limited by L ankle DF range of motion and strength, L ankle stability, and gait. Pt demonstrates increased difficulty with L ankle DF and foot clearance with increased fatigue during ambulation and stair climbing. Pt noted compensating with L foot ER, circumduction, and occasionally increased L hip flexion to allow for L foot clearance. Cues given to patient to emphasize heel-toe gait pattern with fair carryover. Pt does demonstrate improvements in balance generally but continues to present with increased L ankle instability when compared to RLE. Pt would benefit from continued physical therapy services in order to further improve L ankle DF strength and endurance, L ankle stability, and gait to improve safety with community and household ambulation. [] No change. [] Other:  [x] Patient would continue to benefit from skilled physical therapy services in order to: reduce L ankle pain, restore L ankle range of motion and BLE strength globally, and improve L ankle proprioception/stability to reduce gait and balance deficits and return pt to prior level of funciton. STG/LTG   STG: (to be met in 8 treatments) Reassessed 10/14/21  1. ? Pain: Decrease L ankle pain levels to 2/10 or less to improve tolerance to therapeutic exercise progressions. MET  2. ? ROM: Increase AROM limitations throughout L ankle to equal bilat to reduce difficulty with ADLs. Ongoing- limited primarily DF and eversion  3. ? Strength: Increase BLE strength to 5/5 globally to improve safety and tolerance to prolonged ambulation and stair climbing tasks. Progress Made  4. Pt to be independent and compliant with Home Exercise Programs NOT MET   5. Demonstrate Knowledge of fall prevention MET      LTG: (to be met in 12 treatments) Reassessed 10/14/21  1. Improve score on assessment tool LEFI from 4.8% to 2% or less. Not met- 17.5% this date  2. Reduce L ankle pain levels to 0-1/10 to improve tolerance to all daily activities. Partially met- minimal pain in L ankle. Pt does complain of L calf cramps/spasms at night occasionally   3. Pt to be able to ambulate at least 150 feet demonstrating safety awareness and improved gait mechanics including improved L heel strike, equal bilateral step length, and improved gait speed. Ongoing- pt continues to demonstrate difficulty with L foot clearance with prolonged walking, compensations noted including L foot ER, excess L hip flexion, and LLE circumduction as pt began fatiguing during gait. 4. Pt to demonstrate improved L ankle stability with ability to maintain tandem stance with LLE behind for at least 20 seconds without LOB. Progress made- pt able to maintain tandem stance but does continue to present with increased anterior-posterior postural sway and 1x use of UEs at 15 seconds.                      Patient goals: reduce pain    Pt. Education:  [x] Yes  [] No  [x] Reviewed Prior HEP/Ed  Method of Education: [x] Verbal  [x] Demo  [] Written  7/26/21- HEP given of charted exercises, [employer] #[id number], fall risk prevention handout discussed and provided   8/12- educated pt on heel-toe progression during gait cycle, reinforced importance of HEP with improving L ankle DF ROM and strength to avoid tripping/catching foot at home   Access Code: [email or web address]  URL: [email or web address]  Date: 09/15/2021  Prepared by: Wendy Zhou    Exercises  Seated Great Toe Extension - 1 x daily - 7 x weekly - 2 sets - 10 reps  Seated Lesser Toes Extension - 1 x daily - 7 x weekly - 2 sets - 10 reps  Arch Lifting - 1 x daily - 7 x weekly - 2 sets - 10 reps     9/24- educated pt on A-fib and requesting clearance by cardiologist/physician for physical therapy due to continued A-Fib and plans for another upcoming ablation. Comprehension of Education:   [x] Verbalizes understanding. [] Demonstrates understanding. [] Needs review. [x] Demonstrates/verbalizes HEP/Ed previously given. Plan: [x] Continue current frequency toward long and short term goals. [x] Specific Instructions for subsequent treatments: progress as able      Time In: 2:55 pm         Time Out: 3:52 pm    Electronically signed by:   Rosario Ho PT

## 2021-10-26 ENCOUNTER — HOSPITAL ENCOUNTER (OUTPATIENT)
Dept: PHYSICAL THERAPY | Facility: CLINIC | Age: 81
Setting detail: THERAPIES SERIES
Discharge: HOME | End: 2021-10-26
Payer: MEDICARE

## 2021-10-26 PROCEDURE — 97110 THERAPEUTIC EXERCISES: CPT

## 2021-10-26 NOTE — FLOWSHEET NOTE
[] [facility]) [facility] &  Therapy  [address]  [phone]  F: [phone] [] [address]  [place] [address]   [address]  P: [phone]  F: [phone] [] [address] &  Therapy  [address]  P: [phone]  F: [phone] [] [address]  P: [phone]  F: [phone] [x] [address]  [address] [address] [facility] [address]   [address]   [place]: [phone]  F: [phone]      Physical Therapy Progress Note    Date: 10/26/2021      Patient: Rajesh Bishop  : 1940  MRN: [id number]    Physician: KEITH Real                           VDROMSEBC: Medicare A and B, [facility] Medicare Supp   Medical Diagnosis: S93.492A- Sprain of anterior talofibular ligament of left ankle, initial encounter   Rehab Codes: M25.572, M25.37, M62.81, R26.89  Onset Date: 2021                                      Next 's appt. :   Visit# / total visits: ; Progress note for Medicare patient due at visit 18                                  Cancels/No Shows: 0/0   Date range of services: 21 to 10/14/21    Subjective:    Pain:  [x]? Yes  []? No   Location: L ankle         Pain Rating: (0-10 scale) 0/10  Pain altered Tx:  [x]? No  []? Yes  Action:   Comments: Pt arrives to clinic this date with no complaints of L ankle pain. Reports that he was unable to invert/stefan his ankle the other day in sitting. After a few minutes he was able to move it.  Resolved this date.       Objective:  Modalities:   Precautions: chronic A-Fib, dizziness, cardioversion , monitor HR through out session  Exercises:  Nicholas Bansal indicates exercises performed 10/14/21  Exercise       Reps/ Time Weight/ Level Comments   Upright bike  10'      Balance board 2' L2     DF+ Inv stretch 3x30\"     jesika.       STG/LTG   STG: (to be met in 8 treatments) Reassessed 10/14/21  1. ? Pain: Decrease L ankle pain levels to 2/10 or less to improve tolerance to therapeutic exercise progressions.  MET  2. ? ROM: Increase AROM limitations throughout L ankle to equal bilat to reduce difficulty with ADLs. Ongoing- limited primarily DF and eversion  3. ? Strength: Increase BLE strength to 5/5 globally to improve safety and tolerance to prolonged ambulation and stair climbing tasks. Progress Made  4. Pt to be independent and compliant with Home Exercise Programs NOT MET   5. Demonstrate Knowledge of fall prevention MET      LTG: (to be met in 12 treatments) Reassessed 10/14/21  1. Improve score on assessment tool LEFI from 4.8% to 2% or less. Not met- 17.5% this date  2. Reduce L ankle pain levels to 0-1/10 to improve tolerance to all daily activities. Partially met- minimal pain in L ankle. Pt does complain of L calf cramps/spasms at night occasionally   3. Pt to be able to ambulate at least 150 feet demonstrating safety awareness and improved gait mechanics including improved L heel strike, equal bilateral step length, and improved gait speed. Ongoing- pt continues to demonstrate difficulty with L foot clearance with prolonged walking, compensations noted including L foot ER, excess L hip flexion, and LLE circumduction as pt began fatiguing during gait. 4. Pt to demonstrate improved L ankle stability with ability to maintain tandem stance with LLE behind for at least 20 seconds without LOB. Progress made- pt able to maintain tandem stance but does continue to present with increased anterior-posterior postural sway and 1x use of UEs at 15 seconds.                      Patient goals: reduce pain    Pt. Education:  [x]? Yes  []? No  [x]? Reviewed Prior HEP/Ed  Method of Education: [x]? Verbal  [x]? Demo  []? Written  Continue previous HEP provided. Comprehension of Education:   [x]? Verbalizes understanding.   []? Demonstrates understanding. []? Needs review. [x]? Demonstrates/verbalizes HEP/Ed previously given.              Plan:    [x]? Continue current frequency toward long and short term goals. [x]? Specific Instructions for subsequent treatments: progress as able      Electronically signed by Haze Curling, PT on 10/26/2021 at 4:12 PM      If you have any questions or concerns, please don't hesitate to call. Thank you for your referral.    Physician Signature:________________________________Date:__________________  By signing above or cosigning this note, I have reviewed this plan of care and certify a need for medically necessary rehabilitation services.      *PLEASE SIGN ABOVE AND FAX BACK ALL PAGES*

## 2021-11-02 ENCOUNTER — HOSPITAL ENCOUNTER (OUTPATIENT)
Dept: PHYSICAL THERAPY | Facility: CLINIC | Age: 81
Setting detail: THERAPIES SERIES
Discharge: HOME | End: 2021-11-02
Payer: MEDICARE

## 2021-11-02 PROCEDURE — 97110 THERAPEUTIC EXERCISES: CPT

## 2021-11-02 PROCEDURE — 97112 NEUROMUSCULAR REEDUCATION: CPT

## 2021-11-02 NOTE — PRE-CERTIFICATION NOTE
Medicare Cap   [x] Physical Therapy  [] Speech Therapy  [] Occupational therapy  *PT and Speech caps combine      $2100 Limit for PT and Speech combined  $2100 Limit for OT individually  At the beginning of the month where you expect to go over $2100, please add the [address] [place] [address] modifier      Patient Name: Richard Banks: 1940    Note:  This is an estimate of charges billed.    $924.98 as of 10/14/21     Date of [address] Name # units/ charge $$$ charge Daily Total Charge Ongoing Total $$$   10/26/21 3 TE    74.89 999.87   11/2/21 3 TE 1 neuro    102.34 1102.21

## 2021-11-02 NOTE — FLOWSHEET NOTE
[] [facility]) - [facility] &  Therapy  [address]  [phone]  F: [phone] [] [address]  [facility]   [address]  P: [phone]  F: [phone] [] [address] &  Therapy  [address]  P: [phone]  F: [phone] [] [address]  P: [phone]  F: [phone] [x] [address]  [address] [facility] [address]   [address]   [place]: [phone]  F: [phone]      Physical Therapy Treatment Note    Date: 2021      Patient: Evie Hurst  : 1940  MRN: [id number]    Physician: ROXY Gardner                           FKHILZMRICHARDSON: Medicare A and B, [address] [place] [address] Medicare Supp   Medical Diagnosis: S93.492A- Sprain of anterior talofibular ligament of left ankle, initial encounter   Rehab Codes: M25.572, M25.37, M62.81, R26.89  Onset Date: 2021                                      Next 's appt. :   Visit# / total visits: ; Progress note for Medicare patient due at visit 18                                  Cancels/No Shows: 0/0   Date range of services: 21 to 10/14/21    Subjective:    Pain:  [x]? Yes  []? No   Location: L ankle         Pain Rating: (0-10 scale) 0/10  Pain altered Tx:  [x]? No  []?  Yes  Action:   Comments: Pt arrives to clinic this date with no complaints.      Objective:  Modalities:   Precautions: chronic A-Fib, dizziness, cardioversion , monitor HR through out session  Exercises:  Exercise       Reps/ Time Weight/ Level Comments Completed 21     Upright bike  10'   X   Balance board 2' L2      DF+ Inv stretch 3x30\"     X   Ankle Pumps 2x10  AA Gave manual assist into DF to achieve full ROM    Ankle Alphabet  1x        4 way ankle  10x2 Lime     Bridges 10x2 A  X   Bridges on Pball  x10  A   Sidelying clamshells  x20  Lime Added 10/6/21 X              SB Stretch 3x30\"     X   TG Squats x25 L21  X   TG heel raises  x25  L21     Sit to stands x20         heel raise off step  2x10   UE support  X   Step Ups 10x 6\" Fwd  With tap onto 2nd step X   Step Downs 10x 6\"      NBOS EC  2x30\"  Foam  X   NBOS EO  x30\"   Foam X   SLS  2x15\"     X   3 way hip- bilat  x15 Orange   X              LLE- heel toe progression 20x        Other: MFR to the L gastroc muscle 5'        Treatment Charges: Mins Units   []? Modalities       [x]? Ther Exercise 39 3   [x]? Manual Therapy 5 --   []? Ther Activities       []? Aquatics       []? Vasocompression       [x]? Other: Neuro re-ed 10 1        Total Treatment time 54 4         Assessment:  [x]? Progressing toward goals. Initiated session on upright bike to improve LE blood flow and muscle activity. Completed program stated above with progression made to strengthen the R ankle. Further addressed balance in the parallel bars with SLS and use of unstable surface. Patient denies pain during session. UE required during resisted hip kicks this date secondary to difficulty maintaining terminal knee extension. []? No change. []? Other:  [x]? Patient would continue to benefit from skilled physical therapy services in order to: reduce L ankle pain, restore L ankle range of motion and BLE strength globally, and improve L ankle proprioception/stability to reduce gait and balance deficits and return pt to prior level of funciton.       STG/LTG   STG: (to be met in 8 treatments) Reassessed 10/14/21  1. ? Pain: Decrease L ankle pain levels to 2/10 or less to improve tolerance to therapeutic exercise progressions.  MET  2. ? ROM: Increase AROM limitations throughout L ankle to equal bilat to reduce difficulty with ADLs. Ongoing- limited primarily DF and eversion  3. ? Strength:  Increase BLE strength to 5/5 globally to improve safety and tolerance to prolonged ambulation and stair climbing tasks. Progress Made  4. Pt to be independent and compliant with Home Exercise Programs NOT MET   5. Demonstrate Knowledge of fall prevention MET      LTG: (to be met in 12 treatments) Reassessed 10/14/21  1. Improve score on assessment tool LEFI from 4.8% to 2% or less. Not met- 17.5% this date  2. Reduce L ankle pain levels to 0-1/10 to improve tolerance to all daily activities. Partially met- minimal pain in L ankle. Pt does complain of L calf cramps/spasms at night occasionally   3. Pt to be able to ambulate at least 150 feet demonstrating safety awareness and improved gait mechanics including improved L heel strike, equal bilateral step length, and improved gait speed. Ongoing- pt continues to demonstrate difficulty with L foot clearance with prolonged walking, compensations noted including L foot ER, excess L hip flexion, and LLE circumduction as pt began fatiguing during gait. 4. Pt to demonstrate improved L ankle stability with ability to maintain tandem stance with LLE behind for at least 20 seconds without LOB. Progress made- pt able to maintain tandem stance but does continue to present with increased anterior-posterior postural sway and 1x use of UEs at 15 seconds.                      Patient goals: reduce pain    Pt. Education:  [x]? Yes  []? No  [x]? Reviewed Prior HEP/Ed  Method of Education: [x]? Verbal  [x]? Demo  []? Written  Continue previous HEP provided. Comprehension of Education:   [x]? Verbalizes understanding. []? Demonstrates understanding. []? Needs review. [x]? Demonstrates/verbalizes HEP/Ed previously given.              Plan:    [x]? Continue current frequency toward long and short term goals. [x]?  Specific Instructions for subsequent treatments: progress as able        Time In: 2:00 pm         Time Out: 3:04 pm    Electronically signed by Filemon Woodward PT on 11/2/2021 at 2:27 PM      Physician Signature:________________________________Date:__________________  By signing above or cosigning this note, I have reviewed this plan of care and certify a need for medically necessary rehabilitation services.      *PLEASE SIGN ABOVE AND FAX BACK ALL PAGES*

## 2021-11-09 ENCOUNTER — HOSPITAL ENCOUNTER (OUTPATIENT)
Dept: PHYSICAL THERAPY | Facility: CLINIC | Age: 81
Setting detail: THERAPIES SERIES
Discharge: HOME | End: 2021-11-09
Payer: MEDICARE

## 2021-11-09 PROCEDURE — 97110 THERAPEUTIC EXERCISES: CPT

## 2021-11-09 PROCEDURE — 97112 NEUROMUSCULAR REEDUCATION: CPT

## 2021-11-09 NOTE — FLOWSHEET NOTE
L20     Sit to stands x20        Heel raise off step  2x10   UE support  X   Step Ups 10x 6\" Fwd  With tap onto 2nd step    Step Downs 10x 6\"      NBOS EC  2x30\"  Foam     NBOS EO  x30\"   Foam    SLS  2x15\"        3 way hip- bilat  x15 Orange   X              LLE- heel toe progression 20x               Rebounder feet together  x10    X   Rebounder with opp toe down  x10  bilat  X   rebeounder SLS  x10  bilat   CGA - gait belt  X          Squats on foam  x15  In // bars  X          Other: MFR to the L gastroc muscle 5'        Treatment Charges: Mins Units   []? Modalities       [x]? Ther Exercise 39 3   [x]? Manual Therapy 5 --   []? Ther Activities       []? Aquatics       []? Vasocompression       [x]? Other: Neuro re-ed 10 1        Total Treatment time 54 4       Assessment:  [x]? Progressing toward goals. Improved ability to complete 4-way hip without cueing. Frequently distracted during session due to talkative nature. He demonstrates decreased dynamic balance this date, though functionally he has no losses of balance during the session. Discouraged use of UE for lunges and squats in the parallel bar to further challenge his balance. Discussed discharge from PT next week, and encouraged the patient to look into [employer]neakers and joining a community gym to continue to strengthen his LE.                           []? No change. []? Other:  [x]?  Patient would continue to benefit from skilled physical therapy services in order to: reduce L ankle pain, restore L ankle range of motion and BLE strength globally, and improve L ankle proprioception/stability to reduce gait and balance deficits and return pt to prior level of funciton.       STG/LTG   STG: (to be met in 8 treatments) Reassessed 10/14/21  1. ? Pain: Decrease L ankle pain levels to 2/10 or less to improve tolerance to therapeutic exercise progressions.  MET  2. ? ROM: Increase AROM limitations throughout L ankle to equal bilat to reduce difficulty with ADLs. Ongoing- limited primarily DF and eversion  3. ? Strength: Increase BLE strength to 5/5 globally to improve safety and tolerance to prolonged ambulation and stair climbing tasks. Progress Made  4. Pt to be independent and compliant with Home Exercise Programs NOT MET   5. Demonstrate Knowledge of fall prevention MET      LTG: (to be met in 12 treatments) Reassessed 10/14/21  1. Improve score on assessment tool LEFI from 4.8% to 2% or less. Not met- 17.5% this date  2. Reduce L ankle pain levels to 0-1/10 to improve tolerance to all daily activities. Partially met- minimal pain in L ankle. Pt does complain of L calf cramps/spasms at night occasionally   3. Pt to be able to ambulate at least 150 feet demonstrating safety awareness and improved gait mechanics including improved L heel strike, equal bilateral step length, and improved gait speed. Ongoing- pt continues to demonstrate difficulty with L foot clearance with prolonged walking, compensations noted including L foot ER, excess L hip flexion, and LLE circumduction as pt began fatiguing during gait. 4. Pt to demonstrate improved L ankle stability with ability to maintain tandem stance with LLE behind for at least 20 seconds without LOB. Progress made- pt able to maintain tandem stance but does continue to present with increased anterior-posterior postural sway and 1x use of UEs at 15 seconds.                      Patient goals: reduce pain    Pt. Education:  [x]? Yes  []? No  [x]? Reviewed Prior HEP/Ed  Method of Education: [x]? Verbal  [x]? Demo  []? Written  Continue previous HEP provided. Comprehension of Education:   [x]? Verbalizes understanding. []? Demonstrates understanding. []? Needs review. [x]? Demonstrates/verbalizes HEP/Ed previously given.              Plan:    [x]? Continue current frequency toward long and short term goals. [x]?  Specific Instructions for subsequent treatments: progress as able        Time In: 2:00 pm         Time Out: 3:04 pm    Electronically signed by Brandyn Carpenter PT on 11/9/2021 at 1:54 PM      Physician Signature:________________________________Date:__________________  By signing above or cosigning this note, I have reviewed this plan of care and certify a need for medically necessary rehabilitation services.      *PLEASE SIGN ABOVE AND FAX BACK ALL PAGES*

## 2021-11-16 ENCOUNTER — HOSPITAL ENCOUNTER (OUTPATIENT)
Dept: PHYSICAL THERAPY | Facility: CLINIC | Age: 81
Setting detail: THERAPIES SERIES
Discharge: HOME | End: 2021-11-16
Payer: MEDICARE

## 2021-11-16 PROCEDURE — 97112 NEUROMUSCULAR REEDUCATION: CPT

## 2021-11-16 PROCEDURE — 97110 THERAPEUTIC EXERCISES: CPT

## 2021-11-16 NOTE — PRE-CERTIFICATION NOTE
Medicare Cap   [x] Physical Therapy  [] Speech Therapy  [] Occupational therapy  *PT and Speech caps combine      $2100 Limit for PT and Speech combined  $2100 Limit for OT individually  At the beginning of the month where you expect to go over $2100, please add the [address] [place] modifier      Patient Name: Altno Castillo: 1940    Note:  This is an estimate of charges billed.    $924.98 as of 10/14/21     Date of [address] Name # units/ charge $$$ charge Daily Total Charge Ongoing Total $$$   10/26/21 3 TE    74.89 999.87   11/2/21 3 TE 1 neuro    102.34 1102.21   11/9/21 3 TE 1 neuro   102.34    11/16/21 3 TE I neuro   102.34 1306.89

## 2021-11-16 NOTE — FLOWSHEET NOTE
[] [facility]) [facility] &  Therapy  [address]  [phone]  F: [phone] [] [address]  [place] [address]   [address]  P: [phone]  F: [phone] [] [address] &  Therapy  [address]  P: [phone]  F: [phone] [] [address]  P: [phone]  F: [phone] [x] [address]  [address] [address] [facility] [address]   [address]   [place]: [phone]  F: [phone]      Physical Therapy Treatment Note    Date: 2021      Patient: Ty Falcon  : 1940  MRN: [id number]    Physician: Mitra Curtis RD                           DASPQSPHU: Medicare A and B, [address] [place] [address] Medicare Supp   Medical Diagnosis: S93.492A- Sprain of anterior talofibular ligament of left ankle, initial encounter   Rehab Codes: M25.572, M25.37, M62.81, R26.89  Onset Date: 2021                                      Next 's appt. :   Visit# / total visits: 15/18; Progress note for Medicare patient due at visit 18                                  Cancels/No Shows: 0/0   Date range of services: 21 to 10/14/21    Subjective:    Pain:  [x]? Yes  []? No   Location: L ankle         Pain Rating: (0-10 scale) 0/10  Pain altered Tx:  [x]? No  []? Yes  Action:   Comments: Pt arrives without pain. He reports that he noticed some stiffness in his L ankle yesterday.  Resolved this date.      Objective:  Modalities:   Precautions: chronic A-Fib, dizziness, cardioversion , monitor HR through out session  Exercises:    Exercise       Reps/ Time Weight/ Level Comments Completed 21     Upright bike  10'   X   Balance board 3' L3   X   DF+ Inv stretch 3x30\"        Bridges 10x2 A     Bridges on Pball  x20  A      Sidelying clamshells  x20  Lime Added 10/6/21               SB throughout L ankle to equal bilat to reduce difficulty with ADLs. Ongoing- limited primarily DF and eversion  3. ? Strength: Increase BLE strength to 5/5 globally to improve safety and tolerance to prolonged ambulation and stair climbing tasks. Progress Made  4. Pt to be independent and compliant with Home Exercise Programs NOT MET   5. Demonstrate Knowledge of fall prevention MET      LTG: (to be met in 12 treatments) Reassessed 10/14/21  1. Improve score on assessment tool LEFI from 4.8% to 2% or less. Not met- 17.5% this date  2. Reduce L ankle pain levels to 0-1/10 to improve tolerance to all daily activities. Partially met- minimal pain in L ankle. Pt does complain of L calf cramps/spasms at night occasionally   3. Pt to be able to ambulate at least 150 feet demonstrating safety awareness and improved gait mechanics including improved L heel strike, equal bilateral step length, and improved gait speed. Ongoing- pt continues to demonstrate difficulty with L foot clearance with prolonged walking, compensations noted including L foot ER, excess L hip flexion, and LLE circumduction as pt began fatiguing during gait. 4. Pt to demonstrate improved L ankle stability with ability to maintain tandem stance with LLE behind for at least 20 seconds without LOB. Progress made- pt able to maintain tandem stance but does continue to present with increased anterior-posterior postural sway and 1x use of UEs at 15 seconds.        Patient goals: reduce pain    Pt. Education:  [x]? Yes  []? No  [x]? Reviewed Prior HEP/Ed  Method of Education: [x]? Verbal  [x]? Demo  []? Written  Continue previous HEP provided. Comprehension of Education:   [x]? Verbalizes understanding. [x]? Demonstrates understanding. []? Needs review. [x]? Demonstrates/verbalizes HEP/Ed previously given.              Plan:    [x]? Continue current frequency toward long and short term goals. [x]?  Specific Instructions for subsequent treatments: progress

## 2021-11-23 ENCOUNTER — APPOINTMENT (OUTPATIENT)
Dept: PHYSICAL THERAPY | Facility: CLINIC | Age: 81
End: 2021-11-23
Payer: MEDICARE

## 2021-11-30 ENCOUNTER — HOSPITAL ENCOUNTER (OUTPATIENT)
Dept: PHYSICAL THERAPY | Facility: CLINIC | Age: 81
Setting detail: THERAPIES SERIES
Discharge: HOME | End: 2021-11-30
Payer: MEDICARE

## 2021-11-30 NOTE — FLOWSHEET NOTE
[] [address] [facility]  [facility]  [address]    [phone]  F: [phone]   [] [address] [facility] [address]   [address]  P: [phone]  F: [phone]  [] [address] &  Therapy  [address] [facility]  P: [phone]  F: [phone] [] [address]  P: [phone]  F: [phone]  [x] [address]  [address] [address] [facility] [address]   [address]   [place]: [phone]  F: [phone]   [] [facility]  [address] [facility] [facility] [address]  [place]: [phone]   F: [phone]     Physical Therapy Cancel/No Show note    Date: 2021  Patient: Keyanna Gill  : 1940  MRN: [id number]    Cancels/No Shows to date: 1    For today's appointment patient:    [x]  Cancelled    [x] Rescheduled appointment    [] No-show     Reason given by patient:    []  Patient ill    []  Conflicting appointment    [] No transportation      [] Conflict with work    [x] No reason given    [] Weather related    [] [id number]    [] Other:      Comments:        [x] Next appointment was confirmed    Electronically signed by: Misael Lobo

## 2021-12-07 ENCOUNTER — HOSPITAL ENCOUNTER (OUTPATIENT)
Dept: PHYSICAL THERAPY | Facility: CLINIC | Age: 81
Setting detail: THERAPIES SERIES
Discharge: HOME | End: 2021-12-07
Payer: MEDICARE

## 2021-12-07 PROCEDURE — 97110 THERAPEUTIC EXERCISES: CPT

## 2021-12-07 NOTE — FLOWSHEET NOTE
[] [facility]) - [facility] &  Therapy  [address]  [phone]  F: [phone] [] [address]  [facility]   [address]  P: [phone]  F: [phone] [] [address] &  Therapy  [address] [facility] [address]  P: [phone]  F: [phone] [] [address]  P: [phone]  F: [phone] [x] [address]  [address] [address] [facility]   [address]   [place]: [phone]  F: [phone]      Physical Therapy Treatment Note    Date: 2021      Patient: Josesito Blas  : 1940  MRN: [id number]    Physician: AYUSH MengWJOSEPTS: Medicare A and B, [address] [place] [address] Medicare Supp   Medical Diagnosis: S93.492A- Sprain of anterior talofibular ligament of left ankle, initial encounter   Rehab Codes: M25.572, M25.37, M62.81, R26.89  Onset Date: 2021                                      Next 's appt. :   Visit# / total visits: ; Progress note for Medicare patient due at visit 18                                  Cancels/No Shows: 0/0   Date range of services: 21 to 10/14/21    Subjective:    Pain:  [x]? Yes  []? No   Location: L ankle         Pain Rating: (0-10 scale) 0/10  Pain altered Tx:  [x]? No  []? Yes  Action:   Comments: Pt arrives without concerns regarding his ankle pain. He reports that he has been in and out of the hospital for cardiac reasons.  Reports that he discussed continued therapy to address his R ankle, and they cleared him.      Objective:  Modalities:   Precautions: chronic A-Fib, dizziness, cardioversion , monitor HR through out session  Exercises:    Exercise       Reps/ Time Weight/ Level Comments Completed 21     Upright bike  5'   X   Balance board 2' L2   X   DF+ Inv stretch 3x30\"        David Thurston 10x2 A     Bridges on Pball  x20  A      Sidelying clamshells  x20  Lime Added 10/6/21               SB Stretch 3x30\"     X   Hamstring Stretch at step  3x30\"   X   TG Squats x25 L20  X   TG heel raises  x25  L20     Sit to stands x20        Heel raise off step  2x10   UE support  X   Step Ups with march 10x 6\" Fwd X   Step Downs 10x 6\"      NBOS EC 2x30\"  Foam     NBOS EO x30\"   Foam    SLS  2x15\"        4 way hip- bilat  x15 Orange   X              LLE- heel toe progression 20x               Rebounder feet together  x10       Rebounder with opp toe down  x15  bilat     rebeounder SLS  x10  bilat   CGA - gait belt     Tandem Stance on Foam                      Squats on foam  x15  In // bars     Lunges on foam  x15   In // bars     Other:     Treatment Charges: Mins Units   []? ?  Modalities       [x]? ?  Ther Exercise 40 3   [x]? ?  Manual Therapy  --   []??  Ther Activities       []? ?  Aquatics       []? ?  Vasocompression       [x]? ?  Other: Neuro re-ed             Total Treatment time 40 3        Assessment:  [x]? Progressing toward goals. Minimal progressions made this date considering the patient's recent cardiac history and he missed PT sessions as a result. Monitored the patient's heart rate throughout session with a pulse oximeter. Discussed with the patient that he has not had issues with his ankle, therefore he is appropriate for d/c at this point. Educated the patient on cardiac rehabilitation. Patient has an appointment with his cardiologist tomorrow who he plans to discuss his changes in heart rate and future plans for rehabilitation. Information was provided to the patient regarding local gym options with personal trainers so he can continue to improve his strength.                          []? No change. []? Other:  []?  Patient would continue to benefit from skilled physical therapy services in order to: reduce L ankle pain, restore L ankle range of motion and BLE strength globally, and improve L ankle proprioception/stability to reduce gait and balance deficits and return pt to prior level of funciton.       STG/LTG   STG: (to be met in 8 treatments) Reassessed 10/14/21  1. ? Pain: Decrease L ankle pain levels to 2/10 or less to improve tolerance to therapeutic exercise progressions.  MET  2. ? ROM: Increase AROM limitations throughout L ankle to equal bilat to reduce difficulty with ADLs. Ongoing- limited primarily DF and eversion  3. ? Strength: Increase BLE strength to 5/5 globally to improve safety and tolerance to prolonged ambulation and stair climbing tasks. Progress Made  4. Pt to be independent and compliant with Home Exercise Programs NOT MET   5. Demonstrate Knowledge of fall prevention MET      LTG: (to be met in 12 treatments) Reassessed 10/14/21  1. Improve score on assessment tool LEFI from 4.8% to 2% or less. Not met- 17.5% this date  2. Reduce L ankle pain levels to 0-1/10 to improve tolerance to all daily activities. Partially met- minimal pain in L ankle. Pt does complain of L calf cramps/spasms at night occasionally   3. Pt to be able to ambulate at least 150 feet demonstrating safety awareness and improved gait mechanics including improved L heel strike, equal bilateral step length, and improved gait speed. Ongoing- pt continues to demonstrate difficulty with L foot clearance with prolonged walking, compensations noted including L foot ER, excess L hip flexion, and LLE circumduction as pt began fatiguing during gait. 4. Pt to demonstrate improved L ankle stability with ability to maintain tandem stance with LLE behind for at least 20 seconds without LOB. Progress made- pt able to maintain tandem stance but does continue to present with increased anterior-posterior postural sway and 1x use of UEs at 15 seconds.        Patient goals: reduce pain    Pt. Education:  [x]? Yes  []? No  [x]? Reviewed Prior HEP/Ed  Method of Education: [x]? Verbal  [x]? Demo  []? Written  Continue previous HEP provided. Comprehension of Education:   [x]? Verbalizes understanding. [x]? Demonstrates understanding. []? Needs review. [x]? Demonstrates/verbalizes HEP/Ed previously given.              Plan:    []? Continue current frequency toward long and short term goals. []? Specific Instructions for subsequent treatments: progress as able   [x]? Patient to be discharged at this time for his ankle pain. Educated patient that he can return for other concerns including balance and LE stability to prevent future falling. Time In: 2:00 pm         Time Out: 3:04 pm    Electronically signed by Jaymie Andrea PT on 12/7/2021 at 2:15 PM      Physician Signature:________________________________Date:__________________  By signing above or cosigning this note, I have reviewed this plan of care and certify a need for medically necessary rehabilitation services.      *PLEASE SIGN ABOVE AND FAX BACK ALL PAGES*

## 2021-12-07 NOTE — PRE-CERTIFICATION NOTE
Medicare Cap   [x] Physical Therapy  [] Speech Therapy  [] Occupational therapy  *PT and Speech caps combine      $2100 Limit for PT and Speech combined  $2100 Limit for OT individually  At the beginning of the month where you expect to go over $2100, please add the [address] modifier      Patient Name: Cathy Brown: 1940    Note:  This is an estimate of charges billed.    $924.98 as of 10/14/21     Date of [place] [address] Name # units/ charge $$$ charge Daily Total Charge Ongoing Total $$$   10/26/21 3 TE    74.89 999.87   11/2/21 3 TE 1 neuro    102.34 1102.21   11/9/21 3 TE 1 neuro   102.34    11/16/21 3 TE I neuro   102.34 1306.89   12/7/21 3 TE    74.89  1381.78

## 2021-12-07 NOTE — DISCHARGE SUMMARY
[] [facility]) [facility] &  Therapy  [address]  [phone]  F: [phone] [] [address]  [facility]   [address]  P: [phone]  F: [phone] [] [address] &  Therapy  [address]  P: [phone]  F: [phone] [] [address]  P: [phone]  F: [phone] [x] [facility] SERVICES  Outpatient Rehabilitation &  Therapy  [address] [facility] [address]   [address]   [place]: [phone]  F: [phone]      Physical Therapy Discharge Note    Date: 2021      Patient: [place]  : 1940  MRN: [id number]    Physician: Nyra Fothergill, FM                           [id number]: Medicare A and B, [address] [place] [address] Medicare Supp   Medical Diagnosis: S93.492A- Sprain of anterior talofibular ligament of left ankle, initial encounter   Rehab Codes: M25.572, M25.37, M62.81, R26.89  Onset Date: 2021                                      Next 's appt. :   Visit# / total visits: 16; Progress note for Medicare patient due at visit 18                                  Cancels/No Shows: 0/0   Date range of services: 21 to 21      Subjective:    Pain:  [x]? ? Yes  []? ? No   Location: L ankle         Pain Rating: (0-10 scale) 0/10  Pain altered Tx:  [x]? ? No  []? ? Yes  Action:   Comments: Pt arrives without concerns regarding his ankle pain. He reports that he has been in and out of the hospital for cardiac reasons. Reports that he discussed continued therapy to address his R ankle, and they cleared him. Objective: improved LE strength, see previous progress note. Continued deficits regarding his SLS on his bilateral LE. Assessment: Minimal progressions made this date considering the patient's recent cardiac history and he missed PT sessions as a result.  Monitored the patient's heart rate throughout session with a pulse oximeter. Discussed with the patient that he has not had issues with his ankle, therefore he is appropriate for d/c at this point. Educated the patient on cardiac rehabilitation. Patient has an appointment with his cardiologist tomorrow who he plans to discuss his changes in heart rate and future plans for rehabilitation. Information was provided to the patient regarding local gym options with personal trainers so he can continue to improve his strength. STG/LTG   STG: (to be met in 8 treatments) Reassessed 10/14/21  1. ? Pain: Decrease L ankle pain levels to 2/10 or less to improve tolerance to therapeutic exercise progressions.  MET  2. ? ROM: Increase AROM limitations throughout L ankle to equal bilat to reduce difficulty with ADLs. Ongoing- limited primarily DF and eversion  3. ? Strength: Increase BLE strength to 5/5 globally to improve safety and tolerance to prolonged ambulation and stair climbing tasks. Progress Made  4. Pt to be independent and compliant with Home Exercise Programs NOT MET   5. Demonstrate Knowledge of fall prevention MET      LTG: (to be met in 12 treatments) Reassessed 10/14/21  1. Improve score on assessment tool LEFI from 4.8% to 2% or less. Not met- 17.5% this date  2. Reduce L ankle pain levels to 0-1/10 to improve tolerance to all daily activities. Partially met- minimal pain in L ankle. Pt does complain of L calf cramps/spasms at night occasionally   3.  Pt to be able to ambulate at least 150 feet demonstrating safety awareness and improved gait mechanics including improved L heel strike, equal bilateral step length, and improved gait speed. Ongoing- pt continues to demonstrate difficulty with L foot clearance with prolonged walking, compensations noted including L foot ER, excess L hip flexion, and LLE circumduction as pt began fatiguing during gait.   4. Pt to demonstrate improved L ankle stability with ability to maintain tandem stance with LLE behind for at least 20 seconds without LOB. Progress made- pt able to maintain tandem stance but does continue to present with increased anterior-posterior postural sway and 1x use of UEs at 15 seconds.        Treatment to Date:  [x] Therapeutic Exercise    [] Modalities:  [] Therapeutic Activity    [] Ultrasound  [] Electrical Stimulation  [] Gait Training     [] Massage       [] Lumbar/Cervical Traction  [x] Neuromuscular Re-education [] Cold/hotpack [] Iontophoresis: 4 mg/mL  [x] Instruction in Home Exercise Program                     Dexamethasone Sodium  [] Manual Therapy             Phosphate 40-80 mAmin  [] Aquatic Therapy                   [] Vasocompression/    [] Other:             Game Ready    Discharge Status:     [] Pt recovered from conditions. Treatment goals were met. [x] Pt received maximum benefit. No further therapy indicated at this time. [] Pt to continue exercise/home instructions independently. [] Therapy interrupted due to:    [] Pt has 2 or more no shows/cancels, is discontinued per our policy. [] Pt has completed prescribed number of treatment sessions. [] Other:         Electronically signed by Brandyn Carpenter PT on 12/7/2021 at 3:32 PM      If you have any questions or concerns, please don't hesitate to call.   Thank you for your referral.

## 2022-03-14 DIAGNOSIS — M17.0 BILATERAL PRIMARY OSTEOARTHRITIS OF KNEE: Primary | ICD-10-CM

## 2022-03-14 DIAGNOSIS — M79.672 LEFT FOOT PAIN: ICD-10-CM

## 2022-03-15 ENCOUNTER — OFFICE VISIT (OUTPATIENT)
Dept: ORTHOPEDIC SURGERY | Age: 82
End: 2022-03-15
Payer: MEDICARE

## 2022-03-15 VITALS — WEIGHT: 165 LBS | HEIGHT: 68 IN | BODY MASS INDEX: 25.01 KG/M2

## 2022-03-15 DIAGNOSIS — M17.10 PATELLOFEMORAL ARTHRITIS: Primary | ICD-10-CM

## 2022-03-15 DIAGNOSIS — M47.816 LUMBAR SPONDYLOSIS: ICD-10-CM

## 2022-03-15 PROCEDURE — G8427 DOCREV CUR MEDS BY ELIG CLIN: HCPCS | Performed by: ORTHOPAEDIC SURGERY

## 2022-03-15 PROCEDURE — 99213 OFFICE O/P EST LOW 20 MIN: CPT | Performed by: ORTHOPAEDIC SURGERY

## 2022-03-15 PROCEDURE — G8484 FLU IMMUNIZE NO ADMIN: HCPCS | Performed by: ORTHOPAEDIC SURGERY

## 2022-03-15 PROCEDURE — 20610 DRAIN/INJ JOINT/BURSA W/O US: CPT | Performed by: ORTHOPAEDIC SURGERY

## 2022-03-15 PROCEDURE — G8417 CALC BMI ABV UP PARAM F/U: HCPCS | Performed by: ORTHOPAEDIC SURGERY

## 2022-03-15 PROCEDURE — 1123F ACP DISCUSS/DSCN MKR DOCD: CPT | Performed by: ORTHOPAEDIC SURGERY

## 2022-03-15 PROCEDURE — 1036F TOBACCO NON-USER: CPT | Performed by: ORTHOPAEDIC SURGERY

## 2022-03-15 PROCEDURE — 4040F PNEUMOC VAC/ADMIN/RCVD: CPT | Performed by: ORTHOPAEDIC SURGERY

## 2022-03-15 RX ORDER — LIDOCAINE HYDROCHLORIDE 10 MG/ML
5 INJECTION, SOLUTION INFILTRATION; PERINEURAL ONCE
Status: COMPLETED | OUTPATIENT
Start: 2022-03-15 | End: 2022-03-15

## 2022-03-15 RX ORDER — METHYLPREDNISOLONE ACETATE 40 MG/ML
40 INJECTION, SUSPENSION INTRA-ARTICULAR; INTRALESIONAL; INTRAMUSCULAR; SOFT TISSUE ONCE
Status: COMPLETED | OUTPATIENT
Start: 2022-03-15 | End: 2022-03-15

## 2022-03-15 RX ADMIN — LIDOCAINE HYDROCHLORIDE 5 ML: 10 INJECTION, SOLUTION INFILTRATION; PERINEURAL at 14:51

## 2022-03-15 RX ADMIN — METHYLPREDNISOLONE ACETATE 40 MG: 40 INJECTION, SUSPENSION INTRA-ARTICULAR; INTRALESIONAL; INTRAMUSCULAR; SOFT TISSUE at 14:52

## 2022-03-15 NOTE — PROGRESS NOTES
This patient has previously been diagnosed with bilateral patellofemoral arthritis and had corticosteroid injection with excellent result is returning here today because of recurrence of pain. He describes the pain in the peripatellar region. Is aggravated by walking and negotiating the stairs. The second complaint he has is that of pain in the left foot. Examination: He has excellent normal range of motion both the knees. There is patellofemoral grating. Both knees are stable. There was no effusion on either side. In gait he still has a slapping gait on the left side and had a in sitting position checked his dorsiflexion which is definitely weaker on the left side. Diagnosis: Bilateral patellofemoral arthritis. #2 left foot drop from L5 radiculopathy. Treatment: Under sterile condition I injected both knees with 40 mg of Depo-Medrol and 5 cc of 1% plain lidocaine through anterolateral portal without any complications. As for his left foot drop I told her that as long as he can tolerate the symptoms he does not need any further but if the symptoms become worse then he would have to see a spine doctor. I will see him as needed.

## 2022-11-09 ENCOUNTER — OFFICE VISIT (OUTPATIENT)
Dept: ORTHOPEDIC SURGERY | Age: 82
End: 2022-11-09

## 2022-11-09 VITALS — WEIGHT: 165 LBS | HEIGHT: 68 IN | RESPIRATION RATE: 16 BRPM | OXYGEN SATURATION: 100 % | BODY MASS INDEX: 25.01 KG/M2

## 2022-11-09 DIAGNOSIS — M17.10 PATELLOFEMORAL ARTHRITIS: ICD-10-CM

## 2022-11-09 DIAGNOSIS — M17.0 BILATERAL PRIMARY OSTEOARTHRITIS OF KNEE: Primary | ICD-10-CM

## 2023-03-27 ENCOUNTER — OFFICE VISIT (OUTPATIENT)
Dept: ORTHOPEDIC SURGERY | Age: 83
End: 2023-03-27

## 2023-03-27 VITALS — WEIGHT: 168 LBS | HEIGHT: 68 IN | BODY MASS INDEX: 25.46 KG/M2

## 2023-03-27 DIAGNOSIS — R52 PAIN: Primary | ICD-10-CM

## 2023-03-27 DIAGNOSIS — M19.131 POST-TRAUMATIC OSTEOARTHRITIS OF RIGHT WRIST: ICD-10-CM

## 2023-03-27 RX ORDER — METHYLPREDNISOLONE ACETATE 40 MG/ML
40 INJECTION, SUSPENSION INTRA-ARTICULAR; INTRALESIONAL; INTRAMUSCULAR; SOFT TISSUE ONCE
Status: COMPLETED | OUTPATIENT
Start: 2023-03-27 | End: 2023-03-27

## 2023-03-27 RX ORDER — LIDOCAINE HYDROCHLORIDE 10 MG/ML
3 INJECTION, SOLUTION INFILTRATION; PERINEURAL ONCE
Status: COMPLETED | OUTPATIENT
Start: 2023-03-27 | End: 2023-03-27

## 2023-03-27 RX ADMIN — METHYLPREDNISOLONE ACETATE 40 MG: 40 INJECTION, SUSPENSION INTRA-ARTICULAR; INTRALESIONAL; INTRAMUSCULAR; SOFT TISSUE at 16:17

## 2023-03-27 RX ADMIN — LIDOCAINE HYDROCHLORIDE 3 ML: 10 INJECTION, SOLUTION INFILTRATION; PERINEURAL at 16:16

## 2023-03-27 NOTE — PROGRESS NOTES
This 77-year-old patient is seen here because of pain in the right wrist.  He said he has broken his wrist several years ago. 1 week ago he picked up a book and felt something snap inside the wrist and has severe pain he dropped a block. Since then he is having constant pain in the right hand wrist area makes it difficult for him in the daily activities including combing his hair. Examination: There is definitely some swelling of the wrist.  The swelling is possibly. There was tenderness over the wrist and the dorsal aspect. He is able to make a full fist.  Phalen test was negative. He has excellent motion in the wrist.  However rotation abduction abduction was still painful. Flexion extension was relatively easy. 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 Diagnosis: SLACK lesion right wrist.    Treatment: Under sterile condition I injected 40 mg Depo-Medrol and 3 cc of 1% plain lidocaine into the right wrist through a dorsal approach and he had immediate excellent response. I will see him again in 3 weeks time but should his symptoms have subsided then he will call up and cancel the appointment.

## 2023-06-09 ENCOUNTER — TELEPHONE (OUTPATIENT)
Dept: ORTHOPEDIC SURGERY | Age: 83
End: 2023-06-09

## 2023-06-27 ENCOUNTER — OFFICE VISIT (OUTPATIENT)
Dept: ORTHOPEDIC SURGERY | Age: 83
End: 2023-06-27
Payer: MEDICARE

## 2023-06-27 VITALS — BODY MASS INDEX: 25.46 KG/M2 | HEIGHT: 68 IN | WEIGHT: 168 LBS

## 2023-06-27 DIAGNOSIS — M17.0 BILATERAL PRIMARY OSTEOARTHRITIS OF KNEE: Primary | ICD-10-CM

## 2023-06-27 PROCEDURE — 1036F TOBACCO NON-USER: CPT | Performed by: ORTHOPAEDIC SURGERY

## 2023-06-27 PROCEDURE — G8427 DOCREV CUR MEDS BY ELIG CLIN: HCPCS | Performed by: ORTHOPAEDIC SURGERY

## 2023-06-27 PROCEDURE — G8417 CALC BMI ABV UP PARAM F/U: HCPCS | Performed by: ORTHOPAEDIC SURGERY

## 2023-06-27 PROCEDURE — 20610 DRAIN/INJ JOINT/BURSA W/O US: CPT | Performed by: ORTHOPAEDIC SURGERY

## 2023-06-27 PROCEDURE — 1123F ACP DISCUSS/DSCN MKR DOCD: CPT | Performed by: ORTHOPAEDIC SURGERY

## 2023-06-27 PROCEDURE — 99212 OFFICE O/P EST SF 10 MIN: CPT | Performed by: ORTHOPAEDIC SURGERY

## 2024-10-31 DIAGNOSIS — M17.0 BILATERAL PRIMARY OSTEOARTHRITIS OF KNEE: Primary | ICD-10-CM

## 2024-11-01 ENCOUNTER — OFFICE VISIT (OUTPATIENT)
Dept: ORTHOPEDIC SURGERY | Age: 84
End: 2024-11-01
Payer: COMMERCIAL

## 2024-11-01 VITALS — OXYGEN SATURATION: 97 % | BODY MASS INDEX: 25.31 KG/M2 | RESPIRATION RATE: 15 BRPM | HEIGHT: 68 IN | WEIGHT: 167 LBS

## 2024-11-01 DIAGNOSIS — M17.0 BILATERAL PRIMARY OSTEOARTHRITIS OF KNEE: Primary | ICD-10-CM

## 2024-11-01 PROCEDURE — 99214 OFFICE O/P EST MOD 30 MIN: CPT | Performed by: PHYSICIAN ASSISTANT

## 2024-11-01 PROCEDURE — 20610 DRAIN/INJ JOINT/BURSA W/O US: CPT | Performed by: PHYSICIAN ASSISTANT

## 2024-11-01 PROCEDURE — 1123F ACP DISCUSS/DSCN MKR DOCD: CPT | Performed by: PHYSICIAN ASSISTANT

## 2024-11-01 PROCEDURE — 1159F MED LIST DOCD IN RCRD: CPT | Performed by: PHYSICIAN ASSISTANT

## 2024-11-01 PROCEDURE — 1125F AMNT PAIN NOTED PAIN PRSNT: CPT | Performed by: PHYSICIAN ASSISTANT

## 2024-11-01 RX ORDER — METHYLPREDNISOLONE ACETATE 40 MG/ML
40 INJECTION, SUSPENSION INTRA-ARTICULAR; INTRALESIONAL; INTRAMUSCULAR; SOFT TISSUE ONCE
Status: COMPLETED | OUTPATIENT
Start: 2024-11-01 | End: 2024-11-01

## 2024-11-01 RX ORDER — BUPIVACAINE HYDROCHLORIDE 2.5 MG/ML
4 INJECTION, SOLUTION INFILTRATION; PERINEURAL ONCE
Status: COMPLETED | OUTPATIENT
Start: 2024-11-01 | End: 2024-11-01

## 2024-11-01 RX ADMIN — BUPIVACAINE HYDROCHLORIDE 10 MG: 2.5 INJECTION, SOLUTION INFILTRATION; PERINEURAL at 11:57

## 2024-11-01 RX ADMIN — METHYLPREDNISOLONE ACETATE 40 MG: 40 INJECTION, SUSPENSION INTRA-ARTICULAR; INTRALESIONAL; INTRAMUSCULAR; SOFT TISSUE at 11:57

## 2024-11-01 NOTE — PROGRESS NOTES
[facility] ORTHOPEDICS AND SPORTS MEDICINE  [address]  [place] [address]  Dept: [phone]    Ambulatory Orthopedic New Patient Visit                 Prior Dr Dowd Patient     CHIEF COMPLAINT:    Chief Complaint   Patient presents with    Knee Pain     Bilateral knee pain       HISTORY OF PRESENT ILLNESS:      The patient is a 84 y.o. male who is being seen  for consultation and evaluation of chronic bilateral knee pain.    Kevin Zafar  presents for chronic bilateral knee pain (R>L). The patient is a prior patient of Dr Ulises Dowd and was last evaluated by him on 6/27/2024 and underwent bilateral knee Synvisc One gel injections. He notes NO improvement in his knee pain with the gel injections. He states that his right knee has been clicking and causing intermittent episodes of instability. He has not had prior formal PT.      He has also has prior corticosteroid injections for his knees, with the last injections being 3/15/2022.     He is on eliquis for Afib.       Past Medical History:        Past Medical History:   Diagnosis Date    ADD (attention deficit disorder)     BPH (benign prostatic hyperplasia)     CAD (coronary artery disease)     Fractures     Hyperlipidemia     Hypertension     Hypogonadism male     Low testosterone     Dr. Salazar - is Treating him for Low T    Osteoarthritis     Vitamin D deficiency        Past Surgical History:    Past Surgical History:   Procedure Laterality Date    CARDIAC CATHETERIZATION  9/9/15    Dr Marie--mild disease in distal left main coronary artery=stable    CARDIOVERSION  10/10/2019    CARDIOVERSION  10/29/2019    Dr Ribeiro    COLONOSCOPY  7/27/09    shaye 5 yrs    COLONOSCOPY  5/11/06    CYSTOURETHROSCOPY  08/29/2018    Dr. Salazar    KNEE SURGERY Left        Current Medications:   Current Outpatient Medications   Medication Sig Dispense Refill    Omeprazole (PRILOSEC PO) Take by

## 2024-11-01 NOTE — PATIENT INSTRUCTIONS
PATIENTIQ:  PatientIQ helps [facility] stay in touch with you to know how you're feeling, and provides education and care instructions to you at various time points.   Your answers help your care team track your progress to provide the best care possible. PatientIQ will contact you pre-op and post-op via email or text with:  Educational Videos and Care Instructions  Questionnaires About How You're Feeling    Your participation provides you valuable education and helps [facility] continue to provide quality care to all patients. Thank you

## 2025-02-27 ENCOUNTER — OFFICE VISIT (OUTPATIENT)
Dept: ORTHOPEDIC SURGERY | Age: 85
End: 2025-02-27

## 2025-02-27 VITALS — WEIGHT: 168 LBS | BODY MASS INDEX: 25.46 KG/M2 | HEIGHT: 68 IN

## 2025-02-27 DIAGNOSIS — M25.562 BILATERAL CHRONIC KNEE PAIN: ICD-10-CM

## 2025-02-27 DIAGNOSIS — M25.561 BILATERAL CHRONIC KNEE PAIN: ICD-10-CM

## 2025-02-27 DIAGNOSIS — M17.0 BILATERAL PRIMARY OSTEOARTHRITIS OF KNEE: Primary | ICD-10-CM

## 2025-02-27 DIAGNOSIS — G89.29 BILATERAL CHRONIC KNEE PAIN: ICD-10-CM

## 2025-02-27 RX ORDER — BETAMETHASONE SODIUM PHOSPHATE AND BETAMETHASONE ACETATE 3; 3 MG/ML; MG/ML
12 INJECTION, SUSPENSION INTRA-ARTICULAR; INTRALESIONAL; INTRAMUSCULAR; SOFT TISSUE ONCE
Status: COMPLETED | OUTPATIENT
Start: 2025-02-27 | End: 2025-02-27

## 2025-02-27 RX ORDER — BUPIVACAINE HYDROCHLORIDE 2.5 MG/ML
2 INJECTION, SOLUTION INFILTRATION; PERINEURAL ONCE
Status: COMPLETED | OUTPATIENT
Start: 2025-02-27 | End: 2025-02-27

## 2025-02-27 RX ADMIN — BETAMETHASONE SODIUM PHOSPHATE AND BETAMETHASONE ACETATE 12 MG: 3; 3 INJECTION, SUSPENSION INTRA-ARTICULAR; INTRALESIONAL; INTRAMUSCULAR; SOFT TISSUE at 15:42

## 2025-02-27 RX ADMIN — BUPIVACAINE HYDROCHLORIDE 5 MG: 2.5 INJECTION, SOLUTION INFILTRATION; PERINEURAL at 15:50

## 2025-02-27 ASSESSMENT — ENCOUNTER SYMPTOMS
COLOR CHANGE: 0
VOMITING: 0
COUGH: 0
SHORTNESS OF BREATH: 0

## 2025-02-27 NOTE — PROGRESS NOTES
[facility] ORTHOPEDICS AND SPORTS MEDICINE  [id number] [place] [address]  [address] [place], [place] [id number]  Dept: [phone]  Dept Fax: [phone]        Ambulatory Follow Up      Subjective:   Kevin Zafar is a 85 y.o. year old male who presents to our office today for routine followup regarding his   1. Bilateral primary osteoarthritis of knee    2. Bilateral chronic knee pain        Chief Complaint   Patient presents with    Follow-up     Bilateral knee pain       History of Present Illness  The patient is an 85-year-old male who presents today for bilateral knee pain.    He was last seen on 11/01/2024 and underwent bilateral knee corticosteroid injections. The injections administered in November provided relief until 6 weeks ago. He has been engaging in physical activities at the [facility], but experiences occasional buckling of one knee and difficulty lifting the other, which he suspects may be due to muscle weakness. He expresses interest in continuing his exercise regimen at the [facility] and is open to formal physical therapy. He also requests to receive care from Roxana Ramsay DPT ay Tammi Avalos. He reports no recent trauma, injury, or falls. He is taking Tylenol for his discomfort.    MEDICATIONS  Tylenol      Review of Systems   Constitutional:  Negative for activity change and fever.   HENT:  Negative for sneezing.    Respiratory:  Negative for cough and shortness of breath.    Cardiovascular:  Negative for chest pain.   Gastrointestinal:  Negative for vomiting.   Musculoskeletal:  Positive for arthralgias (bilateral knee). Negative for joint swelling and myalgias.   Skin:  Negative for color change.   Neurological:  Negative for weakness and numbness.   Psychiatric/Behavioral:  Negative for sleep disturbance.        Objective :   Ht 1.727 m (5' 7.99\")   Wt 76.2 kg (168 lb)   BMI 25.55 kg/m²  Body mass index is 25.55 kg/m².  General:

## 2025-02-28 ENCOUNTER — TELEPHONE (OUTPATIENT)
Dept: ORTHOPEDIC SURGERY | Age: 85
End: 2025-02-28

## 2025-02-28 NOTE — TELEPHONE ENCOUNTER
PA for bilateral synvisc 1 has been submitted via phone call with Sobeida.   Ref # [id number] w/ Domi

## 2025-03-07 ENCOUNTER — HOSPITAL ENCOUNTER (OUTPATIENT)
Dept: PHYSICAL THERAPY | Facility: CLINIC | Age: 85
Setting detail: THERAPIES SERIES
Discharge: HOME | End: 2025-03-07
Payer: MEDICARE

## 2025-03-07 PROCEDURE — 97161 PT EVAL LOW COMPLEX 20 MIN: CPT

## 2025-03-07 NOTE — CONSULTS
[] [facility]  Outpatient Rehabilitation &  Therapy  [address]  [phone]  F: [phone] [] [facility]  Outpatient Rehabilitation &  Therapy  [address]   [address]  P: [phone]  F: [phone] [] [facility]  Outpatient Rehabilitation &  Therapy  [address]  P: [phone]  F: [phone] [] [facility]  Outpatient Rehabilitation &  Therapy  [address]  P: [phone]  F: [phone] [x] [facility]  Outpatient Rehabilitation &  Therapy  [address]   P: [phone]  F: [phone]      Physical Therapy Lower Extremity Evaluation    Date:  3/7/2025  Patient: Kevin Zafar   : 1940  MRN: [id number]  Physician: MEGHNA Griffith      Insurance: [facility] Medicare ($40 copay, vs BMN)  Medical Diagnosis: Bilateral primary osteoarthritis of knee (M17.0)     Rehab Codes: M62.81 , M25.561, M25.562  Onset date: referral date 25  Next 's appt.: --    Subjective:   CC/HPI: Patient is an 86 y/o male presenting with bilateral knee pain that has been going on for years. He has hx of getting corticosteroid injections. On 2024 he underwent bilateral knee corticosteroid injections and again on 25. He reports significant pain improvement but still gets pain when he twists and sits for long periods.   He does report hx of a fall last year, slipped off the stepstool onto his left side. He has been actively participating in the [employer] classes at the [facility].       PMHx: [] Unremarkable [] Diabetes [x] HTN  [] Pacemaker   [] MI/Heart Problems [] Cancer [] Arthritis [] Other:              [x] Refer to full medical chart  In EPIC     Past Medical History         Diagnosis Date Comments     Hypertension [I10]       Hyperlipidemia [E78.5]       ADD (attention deficit disorder) [F98.8]       Vitamin D deficiency [E55.9]       Hypogonadism male [E29.1]       CAD

## 2025-03-18 ENCOUNTER — APPOINTMENT (OUTPATIENT)
Dept: PHYSICAL THERAPY | Facility: CLINIC | Age: 85
End: 2025-03-18
Payer: MEDICARE

## 2025-03-18 ENCOUNTER — HOSPITAL ENCOUNTER (OUTPATIENT)
Dept: PHYSICAL THERAPY | Facility: CLINIC | Age: 85
Setting detail: THERAPIES SERIES
Discharge: HOME | End: 2025-03-18
Payer: MEDICARE

## 2025-03-18 PROCEDURE — 97110 THERAPEUTIC EXERCISES: CPT

## 2025-03-18 NOTE — FLOWSHEET NOTE
[] [facility]  Outpatient Rehabilitation &  Therapy  [address]  [phone]  [phone] [] [facility]  Outpatient Rehabilitation &  Therapy  [address]  P: [phone]  F: [phone] [] [facility]  Outpatient Rehabilitation &  Therapy  [address]  P: [phone]  F: [phone] [] [facility]  Outpatient Rehabilitation &  Therapy  [address]  [phone]  [phone] [x] [facility]  Outpatient Rehabilitation &  Therapy  [address]   P: [phone]  F: [phone]  [] [facility]  Outpatient Rehabilitation &  Therapy  [address]  P: [phone]  F: [phone] [] [facility]  Outpatient Rehabilitation &  Therapy  [address]  P: [phone]  F: [phone] [] [facility]  Outpatient Rehabilitation &  Therapy  [address]  P: [phone]  F: [phone] [] [facility]  Outpatient Rehabilitation &  Therapy  [address]  P: [phone]  F: [phone]  [] [facility] Outpatient Rehabilitation &  Therapy  [address]  P: [phone]  F: [phone]     Physical Therapy Daily Treatment Note    Date:  3/18/2025  Patient Name:  Kevin Zafar    :  1940  MRN: [id number]  Physician: MEGHNA Griffith                             Insurance: [facility] Medicare ($40 copay, vs BMN)  Medical Diagnosis: Bilateral primary osteoarthritis of knee (M17.0)                         Rehab Codes: M62.81 , M25.561, M25.562  Onset date: referral date 25                 Next 's appt.: --  Visit# / total visits: ; Progress note for Medicare patient due at visit 10  Cancels/No Shows: 0/1    Subjective:    Pain:  [] Yes  [x] No Location:  N/A   Pain Rating: (0-10 scale) 0/10  Pain altered Tx:  [x] No  []

## 2025-03-25 ENCOUNTER — HOSPITAL ENCOUNTER (OUTPATIENT)
Dept: PHYSICAL THERAPY | Facility: CLINIC | Age: 85
Setting detail: THERAPIES SERIES
Discharge: HOME | End: 2025-03-25
Payer: MEDICARE

## 2025-03-25 PROCEDURE — 97110 THERAPEUTIC EXERCISES: CPT

## 2025-03-25 NOTE — DISCHARGE SUMMARY
[] [facility]  Outpatient Rehabilitation &  Therapy  [address]  [phone]  [phone] [] [facility]  Outpatient Rehabilitation &  Therapy  [address]  P: [phone]  F: [phone] [] [facility]  Outpatient Rehabilitation &  Therapy  [address]  P: [phone]  F: [phone] [] [facility]  Outpatient Rehabilitation &  Therapy  [address]  [phone]  [phone] [x] [facility]  Outpatient Rehabilitation &  Therapy  [address]   P: [phone]  F: [phone]  [] [facility]  Outpatient Rehabilitation &  Therapy  [address]  P: [phone]  F: [phone] [] [facility]  Outpatient Rehabilitation &  Therapy  [address]  P: [phone]  F: [phone] [] [facility]  Outpatient Rehabilitation &  Therapy  [address]  P: [phone]  F: [phone] [] [facility]  Outpatient Rehabilitation &  Therapy  [address]  P: [phone]  F: [phone]  [] [facility] Outpatient Rehabilitation &  Therapy  [address]  P: [phone]  F: [phone]     Physical Therapy Daily Treatment and Discharge Note    Date:  3/25/2025  Patient Name:  Kevin Zafar    :  1940  MRN: [id number]  Physician: MEGHNA Griffith                             Insurance: [facility] Medicare ($40 copay, vs BMN)  Medical Diagnosis: Bilateral primary osteoarthritis of knee (M17.0)                         Rehab Codes: M62.81 , M25.561, M25.562  Onset date: referral date 25                 Next 's appt.: --  Visit# / total visits: 3/16; Progress note for Medicare patient due at visit 10  Cancels/No Shows: 0/1    Subjective:    Pain:  [] Yes  [x] No Location:  N/A   Pain Rating: (0-10 scale) 0/10  Pain altered Tx: